# Patient Record
Sex: FEMALE | Race: AMERICAN INDIAN OR ALASKA NATIVE | NOT HISPANIC OR LATINO
[De-identification: names, ages, dates, MRNs, and addresses within clinical notes are randomized per-mention and may not be internally consistent; named-entity substitution may affect disease eponyms.]

---

## 2017-10-05 PROBLEM — Z00.00 ENCOUNTER FOR PREVENTIVE HEALTH EXAMINATION: Status: ACTIVE | Noted: 2017-10-05

## 2018-01-23 DIAGNOSIS — Z82.49 FAMILY HISTORY OF ISCHEMIC HEART DISEASE AND OTHER DISEASES OF THE CIRCULATORY SYSTEM: ICD-10-CM

## 2018-01-23 DIAGNOSIS — Z80.3 FAMILY HISTORY OF MALIGNANT NEOPLASM OF BREAST: ICD-10-CM

## 2018-01-23 DIAGNOSIS — Z83.3 FAMILY HISTORY OF DIABETES MELLITUS: ICD-10-CM

## 2018-01-23 DIAGNOSIS — E55.9 VITAMIN D DEFICIENCY, UNSPECIFIED: ICD-10-CM

## 2018-01-23 DIAGNOSIS — E53.8 DEFICIENCY OF OTHER SPECIFIED B GROUP VITAMINS: ICD-10-CM

## 2018-01-23 RX ORDER — GUARN/MA-HUANG/P.GIN/S.GINSENG
600-200 TABLET ORAL
Refills: 0 | Status: ACTIVE | COMMUNITY
Start: 2018-01-23

## 2018-01-23 RX ORDER — CHROMIUM 200 MCG
1000 TABLET ORAL DAILY
Refills: 0 | Status: ACTIVE | COMMUNITY
Start: 2018-01-23

## 2018-01-23 RX ORDER — CHLORHEXIDINE GLUCONATE 4 %
1000 LIQUID (ML) TOPICAL
Qty: 90 | Refills: 3 | Status: ACTIVE | COMMUNITY
Start: 2018-01-23

## 2018-01-24 ENCOUNTER — APPOINTMENT (OUTPATIENT)
Dept: ENDOCRINOLOGY | Facility: CLINIC | Age: 68
End: 2018-01-24
Payer: MEDICARE

## 2018-01-24 VITALS
BODY MASS INDEX: 22.49 KG/M2 | WEIGHT: 135 LBS | OXYGEN SATURATION: 96 % | SYSTOLIC BLOOD PRESSURE: 122 MMHG | DIASTOLIC BLOOD PRESSURE: 61 MMHG | TEMPERATURE: 98.1 F | HEIGHT: 65 IN | HEART RATE: 64 BPM

## 2018-01-24 DIAGNOSIS — K76.89 OTHER SPECIFIED DISEASES OF LIVER: ICD-10-CM

## 2018-01-24 PROCEDURE — 99214 OFFICE O/P EST MOD 30 MIN: CPT

## 2018-01-24 RX ORDER — LEVOTHYROXINE SODIUM 0.1 MG/1
100 TABLET ORAL
Qty: 90 | Refills: 3 | Status: DISCONTINUED | COMMUNITY
Start: 2018-01-23 | End: 2018-01-24

## 2018-02-24 ENCOUNTER — INPATIENT (INPATIENT)
Facility: HOSPITAL | Age: 68
LOS: 1 days | Discharge: HOME | End: 2018-02-26
Attending: HOSPITALIST

## 2018-02-24 VITALS
HEART RATE: 58 BPM | TEMPERATURE: 97 F | DIASTOLIC BLOOD PRESSURE: 65 MMHG | SYSTOLIC BLOOD PRESSURE: 143 MMHG | OXYGEN SATURATION: 100 % | RESPIRATION RATE: 18 BRPM

## 2018-02-24 DIAGNOSIS — E89.0 POSTPROCEDURAL HYPOTHYROIDISM: Chronic | ICD-10-CM

## 2018-02-24 LAB
ALBUMIN SERPL ELPH-MCNC: 4.4 G/DL — SIGNIFICANT CHANGE UP (ref 3–5.5)
ALP SERPL-CCNC: 50 U/L — SIGNIFICANT CHANGE UP (ref 30–115)
ALT FLD-CCNC: 26 U/L — SIGNIFICANT CHANGE UP (ref 0–41)
ANION GAP SERPL CALC-SCNC: 10 MMOL/L — SIGNIFICANT CHANGE UP (ref 7–14)
AST SERPL-CCNC: 38 U/L — SIGNIFICANT CHANGE UP (ref 0–41)
BILIRUB SERPL-MCNC: 0.8 MG/DL — SIGNIFICANT CHANGE UP (ref 0.2–1.2)
BUN SERPL-MCNC: 6 MG/DL — LOW (ref 10–20)
CALCIUM SERPL-MCNC: 9 MG/DL — SIGNIFICANT CHANGE UP (ref 8.5–10.1)
CHLORIDE SERPL-SCNC: 88 MMOL/L — LOW (ref 98–110)
CK MB BLD-MCNC: 2 % — SIGNIFICANT CHANGE UP (ref 0–4)
CK MB BLD-MCNC: 2 % — SIGNIFICANT CHANGE UP (ref 0–4)
CK MB CFR SERPL CALC: 3.4 NG/ML — SIGNIFICANT CHANGE UP (ref 0.6–6.3)
CK MB CFR SERPL CALC: 3.9 NG/ML — SIGNIFICANT CHANGE UP (ref 0.6–6.3)
CK SERPL-CCNC: 220 U/L — SIGNIFICANT CHANGE UP (ref 0–225)
CK SERPL-CCNC: 257 U/L — HIGH (ref 0–225)
CO2 SERPL-SCNC: 25 MMOL/L — SIGNIFICANT CHANGE UP (ref 17–32)
CREAT SERPL-MCNC: 0.7 MG/DL — SIGNIFICANT CHANGE UP (ref 0.7–1.5)
GLUCOSE SERPL-MCNC: 108 MG/DL — SIGNIFICANT CHANGE UP (ref 70–110)
HCT VFR BLD CALC: 31.7 % — LOW (ref 37–47)
HGB BLD-MCNC: 10.9 G/DL — LOW (ref 14–18)
MCHC RBC-ENTMCNC: 29.5 PG — SIGNIFICANT CHANGE UP (ref 27–31)
MCHC RBC-ENTMCNC: 34.4 G/DL — SIGNIFICANT CHANGE UP (ref 32–37)
MCV RBC AUTO: 85.9 FL — SIGNIFICANT CHANGE UP (ref 81–91)
NRBC # BLD: 0 /100 WBCS — SIGNIFICANT CHANGE UP (ref 0–0)
PLATELET # BLD AUTO: 191 K/UL — SIGNIFICANT CHANGE UP (ref 130–400)
POTASSIUM SERPL-MCNC: 3.7 MMOL/L — SIGNIFICANT CHANGE UP (ref 3.5–5)
POTASSIUM SERPL-SCNC: 3.7 MMOL/L — SIGNIFICANT CHANGE UP (ref 3.5–5)
PROT SERPL-MCNC: 6.7 G/DL — SIGNIFICANT CHANGE UP (ref 6–8)
RBC # BLD: 3.69 M/UL — LOW (ref 4.2–5.4)
RBC # FLD: 13.2 % — SIGNIFICANT CHANGE UP (ref 11.5–14.5)
SODIUM SERPL-SCNC: 123 MMOL/L — LOW (ref 135–146)
TROPONIN I SERPL-MCNC: <0.02 NG/ML — SIGNIFICANT CHANGE UP (ref 0–0.05)
TROPONIN I SERPL-MCNC: <0.02 NG/ML — SIGNIFICANT CHANGE UP (ref 0–0.05)
WBC # BLD: 5.23 K/UL — SIGNIFICANT CHANGE UP (ref 4.8–10.8)
WBC # FLD AUTO: 5.23 K/UL — SIGNIFICANT CHANGE UP (ref 4.8–10.8)

## 2018-02-24 RX ORDER — PREGABALIN 225 MG/1
1000 CAPSULE ORAL DAILY
Qty: 0 | Refills: 0 | Status: DISCONTINUED | OUTPATIENT
Start: 2018-02-24 | End: 2018-02-26

## 2018-02-24 RX ORDER — PANTOPRAZOLE SODIUM 20 MG/1
40 TABLET, DELAYED RELEASE ORAL
Qty: 0 | Refills: 0 | Status: DISCONTINUED | OUTPATIENT
Start: 2018-02-24 | End: 2018-02-26

## 2018-02-24 RX ORDER — SODIUM CHLORIDE 9 MG/ML
1000 INJECTION INTRAMUSCULAR; INTRAVENOUS; SUBCUTANEOUS ONCE
Qty: 0 | Refills: 0 | Status: COMPLETED | OUTPATIENT
Start: 2018-02-24 | End: 2018-02-24

## 2018-02-24 RX ORDER — GLUCAGON INJECTION, SOLUTION 0.5 MG/.1ML
1 INJECTION, SOLUTION SUBCUTANEOUS ONCE
Qty: 0 | Refills: 0 | Status: DISCONTINUED | OUTPATIENT
Start: 2018-02-24 | End: 2018-02-26

## 2018-02-24 RX ORDER — HYDROCHLOROTHIAZIDE 25 MG
0 TABLET ORAL
Qty: 0 | Refills: 0 | COMMUNITY

## 2018-02-24 RX ORDER — ASPIRIN/CALCIUM CARB/MAGNESIUM 324 MG
81 TABLET ORAL DAILY
Qty: 0 | Refills: 0 | Status: DISCONTINUED | OUTPATIENT
Start: 2018-02-24 | End: 2018-02-26

## 2018-02-24 RX ORDER — LEVOTHYROXINE SODIUM 125 MCG
100 TABLET ORAL EVERY 24 HOURS
Qty: 0 | Refills: 0 | Status: DISCONTINUED | OUTPATIENT
Start: 2018-02-24 | End: 2018-02-25

## 2018-02-24 RX ORDER — INSULIN GLARGINE 100 [IU]/ML
12 INJECTION, SOLUTION SUBCUTANEOUS EVERY MORNING
Qty: 0 | Refills: 0 | Status: DISCONTINUED | OUTPATIENT
Start: 2018-02-24 | End: 2018-02-26

## 2018-02-24 RX ORDER — ENOXAPARIN SODIUM 100 MG/ML
40 INJECTION SUBCUTANEOUS AT BEDTIME
Qty: 0 | Refills: 0 | Status: DISCONTINUED | OUTPATIENT
Start: 2018-02-24 | End: 2018-02-26

## 2018-02-24 RX ORDER — ATORVASTATIN CALCIUM 80 MG/1
20 TABLET, FILM COATED ORAL AT BEDTIME
Qty: 0 | Refills: 0 | Status: DISCONTINUED | OUTPATIENT
Start: 2018-02-24 | End: 2018-02-26

## 2018-02-24 RX ORDER — LEVOTHYROXINE SODIUM 125 MCG
0 TABLET ORAL
Qty: 0 | Refills: 0 | COMMUNITY

## 2018-02-24 RX ORDER — AMLODIPINE BESYLATE 2.5 MG/1
5 TABLET ORAL DAILY
Qty: 0 | Refills: 0 | Status: DISCONTINUED | OUTPATIENT
Start: 2018-02-24 | End: 2018-02-26

## 2018-02-24 RX ORDER — DEXTROSE 50 % IN WATER 50 %
1 SYRINGE (ML) INTRAVENOUS ONCE
Qty: 0 | Refills: 0 | Status: DISCONTINUED | OUTPATIENT
Start: 2018-02-24 | End: 2018-02-26

## 2018-02-24 RX ORDER — CHOLECALCIFEROL (VITAMIN D3) 125 MCG
1000 CAPSULE ORAL DAILY
Qty: 0 | Refills: 0 | Status: DISCONTINUED | OUTPATIENT
Start: 2018-02-24 | End: 2018-02-26

## 2018-02-24 RX ORDER — INSULIN GLARGINE 100 [IU]/ML
12 INJECTION, SOLUTION SUBCUTANEOUS AT BEDTIME
Qty: 0 | Refills: 0 | Status: DISCONTINUED | OUTPATIENT
Start: 2018-02-24 | End: 2018-02-24

## 2018-02-24 RX ORDER — SODIUM CHLORIDE 9 MG/ML
1000 INJECTION, SOLUTION INTRAVENOUS
Qty: 0 | Refills: 0 | Status: DISCONTINUED | OUTPATIENT
Start: 2018-02-24 | End: 2018-02-26

## 2018-02-24 RX ORDER — DEXTROSE 50 % IN WATER 50 %
12.5 SYRINGE (ML) INTRAVENOUS ONCE
Qty: 0 | Refills: 0 | Status: DISCONTINUED | OUTPATIENT
Start: 2018-02-24 | End: 2018-02-26

## 2018-02-24 RX ORDER — INSULIN LISPRO 100/ML
4 VIAL (ML) SUBCUTANEOUS
Qty: 0 | Refills: 0 | Status: DISCONTINUED | OUTPATIENT
Start: 2018-02-24 | End: 2018-02-26

## 2018-02-24 RX ORDER — METFORMIN HYDROCHLORIDE 850 MG/1
0 TABLET ORAL
Qty: 0 | Refills: 0 | COMMUNITY

## 2018-02-24 RX ADMIN — AMLODIPINE BESYLATE 5 MILLIGRAM(S): 2.5 TABLET ORAL at 20:44

## 2018-02-24 RX ADMIN — Medication 1000 UNIT(S): at 22:16

## 2018-02-24 RX ADMIN — SODIUM CHLORIDE 1000 MILLILITER(S): 9 INJECTION INTRAMUSCULAR; INTRAVENOUS; SUBCUTANEOUS at 15:00

## 2018-02-24 RX ADMIN — Medication 81 MILLIGRAM(S): at 22:15

## 2018-02-24 RX ADMIN — Medication 100 MICROGRAM(S): at 21:37

## 2018-02-24 RX ADMIN — PREGABALIN 1000 MICROGRAM(S): 225 CAPSULE ORAL at 22:15

## 2018-02-24 RX ADMIN — ATORVASTATIN CALCIUM 20 MILLIGRAM(S): 80 TABLET, FILM COATED ORAL at 21:37

## 2018-02-24 RX ADMIN — ENOXAPARIN SODIUM 40 MILLIGRAM(S): 100 INJECTION SUBCUTANEOUS at 21:37

## 2018-02-24 RX ADMIN — PANTOPRAZOLE SODIUM 40 MILLIGRAM(S): 20 TABLET, DELAYED RELEASE ORAL at 21:37

## 2018-02-24 RX ADMIN — SODIUM CHLORIDE 1000 MILLILITER(S): 9 INJECTION INTRAMUSCULAR; INTRAVENOUS; SUBCUTANEOUS at 16:07

## 2018-02-24 NOTE — ED PROVIDER NOTE - NS ED ROS FT
Constitutional: No fever, chills.  Eyes: No visual changes.  ENT: No hearing changes. No sore throat.  Neck: No neck pain or stiffness.  Cardiovascular: No chest pain, palpitations, edema.  Pulmonary: No SOB, cough. No hemoptysis.  Abdominal: No abdominal pain, nausea, vomiting, diarrhea.  : No dysuria, frequency.  Neuro: + headache. No syncope. + dizziness, resolved.  MS: No back pain. No calf pain/swelling.  Psych: No suicidal ideations.

## 2018-02-24 NOTE — ED PROVIDER NOTE - OBJECTIVE STATEMENT
Pt is a 68 y/o female with hx of DM, HTN, DLD, who presents to ED for headache that started this morning, occipital. Pt states has had similar headache intermittent over several years but today headache associated with dizziness and facial and left arm tingling so came in for eval. Pt states headache improved with Alleve and she denies any tingling or dizziness at this time. no visual change, n/v, fever, chest pain, SOB.

## 2018-02-24 NOTE — H&P ADULT - NSHPLABSRESULTS_GEN_ALL_CORE
10.9   5.23  )-----------( 191      ( 24 Feb 2018 14:30 )             31.7     02-24    123<L>  |  88<L>  |  6<L>  ----------------------------<  108  3.7   |  25  |  0.7    Ca    9.0      24 Feb 2018 14:30    TPro  6.7  /  Alb  4.4  /  TBili  0.8  /  DBili  x   /  AST  38  /  ALT  26  /  AlkPhos  50  02-24        CARDIAC MARKERS ( 24 Feb 2018 14:30 )  <0.02 ng/mL / x     / 257 U/L / x     / 3.9 ng/mL    CAPILLARY BLOOD GLUCOSE  108 (24 Feb 2018 14:30)    ECG: Sinus bradychardia to 50    IMPRESSION:     1.  No evidence of acute intracranial pathology.      2.  Mild chronic microvascular changes.    < end of copied text >    < from: Xray Chest 1 View AP/PA (02.24.18 @ 15:10) >    Impression:      1.  Right basilar subsegmental atelectasis.  No focal lung consolidation.    < end of copied text >

## 2018-02-24 NOTE — H&P ADULT - NSHPPHYSICALEXAM_GEN_ALL_CORE
PHYSICAL EXAM:    T(F): 97.3, Max: 97.3 (02-24-18 @ 12:22)  HR: 58  BP: 143/65 ; Repeat BP: 180/80  RR: 18  SpO2: 100%    GENERAL: NAD, well-developed  HEAD:  Atraumatic, Normocephalic  EYES: EOMI, PERRLA, conjunctiva and sclera clear  NECK: Supple, No JVD, thyroidectomy scar  CHEST/LUNG: Clear to auscultation bilaterally; No wheeze  HEART: Regular rhythm; bradychardic; No murmurs, rubs, or gallops  ABDOMEN: Soft, Nontender, Nondistended; Bowel sounds present  EXTREMITIES:  2+ Peripheral Pulses, No clubbing, cyanosis, or edema  PSYCH: AAOx3  NEUROLOGY: non-focal; normal motor power in all extremities, normal sensation, normal cranial nerves, normal reflexes, negative babinski  SKIN: No rashes or lesions

## 2018-02-24 NOTE — H&P ADULT - NSHPSOCIALHISTORY_GEN_ALL_CORE
Social History:    Lives with: ( x ) alone  (  ) children   (  ) spouse   (  ) parents  (  ) other    Substance Use (street drugs): ( x ) never used  (  ) other:  Tobacco Usage:  ( x  ) never smoked   (   ) former smoker   (   ) current smoker  (     ) pack year  (        ) last cigarette date  Alcohol Usage: negative

## 2018-02-24 NOTE — ED PROVIDER NOTE - ATTENDING CONTRIBUTION TO CARE
66yo woman h/o DM, HTN, DLD, chronic HA c/o similar occipital HA this am but associated with dizziness, facial and L arm tingling, which is not typical for her HA. Sx improved with NSAIDS and in the ED pt asymptomatic other than mild HA, just very concerned. Denies visual changes, chest pain, focal weakness. VS, exam as noted, neuro intact with no dysmetria and normal gait. Head CT ok but pt hyponatremic to 123. Given electrolyte abnl and risk factors for CVA, pt admitted for further workup.

## 2018-02-24 NOTE — H&P ADULT - ASSESSMENT
68 yo female patient with PMHx of systemic HTN, DL, DM II, Acquired hypothyroidism, presented with headache and facial and arm numbness found to have hyponatremia and elevated BP.    Working diagnosis: Symptomatic Hyponatremia and Headache  Will admit to Medicine for management    # Symptomatic hypoNa (with arms numbness, headache?)  Most likely secondary to HCTZ ;  Also patient noted that she drinks around 3L of water per day  Clinically patient looks euvolemic  Will F/U Na levels  Will discontinue HCTZ for now  Check serum and urine osmolality, TSH, lipid panel.  If needed, may consult renal    # Headache  Could be secondary to uncontrolled HTN, or to hyponatremia  will increase enalapril to 20mg BID, and add amlodipine 5 (since we will hold hctz for now)  Symptomatic management of headache  May check 2D echo to evaluate for LVH    # Dyslipidemia  check lipid panel; continue with atorvastatin    # Diabetes mellitus  switch to s/c insulin, check Hba1c    # Normocytic anemia Hg 10.9 (was 12.5 2 years ago)  Patient has no evidence of overt bleeding, no melena, no vaginal bleeding  will do anemia work-up    # DVT prophylaxis  # No GI prophylaxis needed  # No IV fluids for now (received 2L NS in ED)  # Diet: carb consistent, 2gr Na  # Activity: as tolerated  # Full code  # Dispo Home

## 2018-02-24 NOTE — ED PROVIDER NOTE - PHYSICAL EXAMINATION
Constitutional: Well developed, well nourished. NAD  Head: Normocephalic, atraumatic.  Eyes: PERRL. EOMI.  ENT: No nasal discharge. Mucous membranes moist.  Neck: Supple. Painless ROM.  Cardiovascular: Normal S1, S2. Regular rate and rhythm. No murmurs, rubs, or gallops.  Pulmonary: Normal respiratory rate and effort. Lungs clear to auscultation bilaterally. No wheezing, rales, or rhonchi.  Abdominal: Soft. Nondistended. Nontender. No rebound, guarding, rigidity.  Extremities. Pelvis stable. No lower extremity edema, symmetric calves.  Skin: No rashes, cyanosis.  Neuro: CN II-XII grossly intact, no facial asymmetry, so slurred speech. Strength 5/5 in upper and lower extremities. Sensation intact in all extremities. FNF testing normal. No pronator drift. Negative Romberg's test. Normal gait.   Psych: Normal mood. Normal affect.

## 2018-02-24 NOTE — H&P ADULT - HISTORY OF PRESENT ILLNESS
66 yo female patient with PMHx of systemic HTN, DL, DM II, Acquired Hypothyroidism (s/p Rt partial thyroidectomy, followed by radioactive iodine ablation of the left side because of left lobe thyroid malignant nodule in 2000), presented because of the above chief complaint.  History goes back to the morning of presentation, when patient started having occipital headache, moderate in intensity, tension like, associated with nausea and facial numbness and bilateral arms numbness. Symptoms were continuous for like 2h then became waxing and waning. Patient admitted having similar episodes of headache, at the same spot, its been years, like twice a month, resolving with tylenol or ibuprofen, but she never had these numbness before, that's why she came to ED today.  In ED, patient was almost asymptomatic, told me she still has minimal headache, and the numbness is intermittent.  No visual symptoms, no dizziness, no fever, no vomiting, no cough, no chest pain, no palpitations, no abdominal pain, no urinary symptoms, no diarrhea or constipation, no melena, no vaginal bleeding...

## 2018-02-24 NOTE — H&P ADULT - ATTENDING COMMENTS
Patient seen and examined independently. I agree with the resident's note, physical exam, and plan except as below.  #Headache and B/l upperextremity parasthesias - improved - seen by neuro - request Ct cspine  #hyponatremia - off hctz - improved - no need for further workup, check TSH level  #DM II - pt wants to stop metformin - will try diet control  if Ct cspine wnl can dc home

## 2018-02-25 LAB
ANION GAP SERPL CALC-SCNC: 6 MMOL/L — LOW (ref 7–14)
BUN SERPL-MCNC: 8 MG/DL — LOW (ref 10–20)
CALCIUM SERPL-MCNC: 8.7 MG/DL — SIGNIFICANT CHANGE UP (ref 8.5–10.1)
CHLORIDE SERPL-SCNC: 99 MMOL/L — SIGNIFICANT CHANGE UP (ref 98–110)
CHOLEST SERPL-MCNC: 189 MG/DL — SIGNIFICANT CHANGE UP (ref 100–200)
CO2 SERPL-SCNC: 28 MMOL/L — SIGNIFICANT CHANGE UP (ref 17–32)
CREAT SERPL-MCNC: 0.8 MG/DL — SIGNIFICANT CHANGE UP (ref 0.7–1.5)
GLUCOSE SERPL-MCNC: 105 MG/DL — SIGNIFICANT CHANGE UP (ref 70–110)
HCT VFR BLD CALC: 32.3 % — LOW (ref 37–47)
HDLC SERPL-MCNC: 109 MG/DL — HIGH (ref 40–60)
HGB BLD-MCNC: 11 G/DL — LOW (ref 14–18)
LIPID PNL WITH DIRECT LDL SERPL: 62 MG/DL — SIGNIFICANT CHANGE UP (ref 50–100)
MCHC RBC-ENTMCNC: 29.6 PG — SIGNIFICANT CHANGE UP (ref 27–31)
MCHC RBC-ENTMCNC: 34.1 G/DL — SIGNIFICANT CHANGE UP (ref 32–37)
MCV RBC AUTO: 87.1 FL — SIGNIFICANT CHANGE UP (ref 81–91)
NRBC # BLD: 0 /100 WBCS — SIGNIFICANT CHANGE UP (ref 0–0)
OSMOLALITY SERPL: 279 MOS/KG — SIGNIFICANT CHANGE UP (ref 289–308)
PLATELET # BLD AUTO: 193 K/UL — SIGNIFICANT CHANGE UP (ref 130–400)
POTASSIUM SERPL-MCNC: 4.5 MMOL/L — SIGNIFICANT CHANGE UP (ref 3.5–5)
POTASSIUM SERPL-SCNC: 4.5 MMOL/L — SIGNIFICANT CHANGE UP (ref 3.5–5)
RBC # BLD: 3.71 M/UL — LOW (ref 4.2–5.4)
RBC # BLD: 3.71 M/UL — LOW (ref 4.2–5.4)
RBC # FLD: 13.7 % — SIGNIFICANT CHANGE UP (ref 11.5–14.5)
RETICS #: 52.7 K/UL — SIGNIFICANT CHANGE UP (ref 25–125)
RETICS/RBC NFR: 1.4 % — SIGNIFICANT CHANGE UP (ref 0.5–1.5)
SODIUM SERPL-SCNC: 133 MMOL/L — LOW (ref 135–146)
TOTAL CHOLESTEROL/HDL RATIO MEASUREMENT: 1.7 RATIO — LOW (ref 4–5.5)
TRIGL SERPL-MCNC: 54 MG/DL — SIGNIFICANT CHANGE UP (ref 40–150)
WBC # BLD: 4.88 K/UL — SIGNIFICANT CHANGE UP (ref 4.8–10.8)
WBC # FLD AUTO: 4.88 K/UL — SIGNIFICANT CHANGE UP (ref 4.8–10.8)

## 2018-02-25 RX ORDER — LEVOTHYROXINE SODIUM 125 MCG
100 TABLET ORAL DAILY
Qty: 0 | Refills: 0 | Status: DISCONTINUED | OUTPATIENT
Start: 2018-02-25 | End: 2018-02-25

## 2018-02-25 RX ORDER — LEVOTHYROXINE SODIUM 125 MCG
88 TABLET ORAL DAILY
Qty: 0 | Refills: 0 | Status: DISCONTINUED | OUTPATIENT
Start: 2018-02-25 | End: 2018-02-26

## 2018-02-25 RX ADMIN — ENOXAPARIN SODIUM 40 MILLIGRAM(S): 100 INJECTION SUBCUTANEOUS at 21:33

## 2018-02-25 RX ADMIN — ATORVASTATIN CALCIUM 20 MILLIGRAM(S): 80 TABLET, FILM COATED ORAL at 21:32

## 2018-02-25 RX ADMIN — Medication 81 MILLIGRAM(S): at 11:47

## 2018-02-25 RX ADMIN — PREGABALIN 1000 MICROGRAM(S): 225 CAPSULE ORAL at 11:48

## 2018-02-25 RX ADMIN — Medication 1000 UNIT(S): at 11:47

## 2018-02-25 RX ADMIN — PANTOPRAZOLE SODIUM 40 MILLIGRAM(S): 20 TABLET, DELAYED RELEASE ORAL at 08:50

## 2018-02-25 RX ADMIN — Medication 88 MICROGRAM(S): at 09:01

## 2018-02-25 NOTE — CONSULT NOTE ADULT - ATTENDING COMMENTS
Patient seen and examined agree with above except as noted.  Patient with 1 week of occipital HA and episode of b/l UE paresthesias.  She still has 5/10 HA and paresthesias have resolved.    Exam  A+Ox3  CN 2-12 normal  power 5/5 throughout  PP, Temp, Vib symmetric  FTN normal  DTR 2+ in LUE and 1+ RUE; 1+ b/l LE  Gait normal    A/P Patient p/w HA and UE paresthesias.  Likely cervical radiculopathy  1. CT cervical spine  2. NSAIDs PRN  3. f/u as out patient if cspine not severe

## 2018-02-25 NOTE — CONSULT NOTE ADULT - SUBJECTIVE AND OBJECTIVE BOX
CC: headache and bilateral arm numbness    HPI:   68 yo female patient with PMH of HTN, DLD, DM II, Thyroid CA, Hypothyroidism (s/p Rt partial thyroidectomy, followed by radioactive iodine ablation of the left side because of left lobe thyroid malignant nodule in 2000), presented because of the above chief complaint.  History goes back to the morning of presentation, when patient started to experience gradual onset  occipital headache which was constant in nature  moderate in intensity, tension like, associated with nausea vomiting x 3  facial  and bilateral arms numbness / paresthesias . Headache was continuous but numbness and paresthesias or arms and face  was  waxing and waning. Patient reports occasional headaches may 2-3 times a month  but she never had  numbness before. No visual symptoms, neck or joint pain ,denies radicular pain dizziness,  fever/chills, cough, no chest pain, palpitations photo or phonophobia . Patient symptoms were noted to be significantly improved on initial eval in ED. Currently denies symptoms.     Home medications  -synthroid 100 mcg  -enalapril 20 mg q 24  -hctz 12.5 mg q 24  -pravastatin 40 mg q 24  -vitamin b12  -vitamin d3  -metformin 500mg bid    Social history:  denies smoking ,alcohol or drug abuse      Neuro Exam:  Orientation: oriented to person, place time and situation, speech fluent ,language intact  Cranial Nerves: visual acuity intact bilaterally, visual fields full to confrontation, pupils equal round and reactive to light, extraocular motion intact, facial sensation intact symmetrically, face symmetrical, hearing was intact bilaterally, tongue and palate midline, head turning and shoulder shrug symmetric and there was no tongue deviation with protrusion.   Motor: muscle tone was normal in all four extremities, muscle strength was normal 5/5  in all four extremities and normal bulk in all four extremities. No  neck  pain or rigidity  Sensory exam: Intact to pinprick ,temperature vibration and proprioception b/l upper and lower extremity and face.  Coordination:. No dysmetria or limb ataxia   Deep tendon reflexes:   Biceps right 2+. Biceps left 2+.    Triceps right 2+. Triceps left 2+.  LOC  Brachioradialis right 2+. Brachioradialis left 2+.    Patella right 2+. Patella left 2+.    Ankle jerk right 2+. Ankle jerk left 2+.   Gait : deferred        Allergies    hydrALAZINE (Other)  penicillin (Rash)    Intolerances      MEDICATIONS  (STANDING):  amLODIPine   Tablet 5 milliGRAM(s) Oral daily  aspirin enteric coated 81 milliGRAM(s) Oral daily  atorvastatin 20 milliGRAM(s) Oral at bedtime  cholecalciferol 1000 Unit(s) Oral daily  cyanocobalamin 1000 MICROGram(s) Oral daily  dextrose 5%. 1000 milliLiter(s) (50 mL/Hr) IV Continuous <Continuous>  dextrose 50% Injectable 12.5 Gram(s) IV Push once  enalapril 20 milliGRAM(s) Oral two times a day  enoxaparin Injectable 40 milliGRAM(s) SubCutaneous at bedtime  insulin glargine Injectable (LANTUS) 12 Unit(s) SubCutaneous every morning  insulin lispro Injectable (HumaLOG) 4 Unit(s) SubCutaneous three times a day before meals  levothyroxine  Oral Tab/Cap - Peds 100 MICROGram(s) Oral every 24 hours  pantoprazole    Tablet 40 milliGRAM(s) Oral before breakfast    MEDICATIONS  (PRN):  dextrose Gel 1 Dose(s) Oral once PRN Blood Glucose LESS THAN 70 milliGRAM(s)/deciliter  glucagon  Injectable 1 milliGRAM(s) IntraMuscular once PRN Glucose LESS THAN 70 milligrams/deciliter      LABS:                        10.9   5.23  )-----------( 191      ( 24 Feb 2018 14:30 )             31.7     02-24    123<L>  |  88<L>  |  6<L>  ----------------------------<  108  3.7   |  25  |  0.7    Ca    9.0      24 Feb 2018 14:30    TPro  6.7  /  Alb  4.4  /  TBili  0.8  /  DBili  x   /  AST  38  /  ALT  26  /  AlkPhos  50  02-24            Neuro Imaging:  < from: CT Head No Cont (02.24.18 @ 15:51) >  No evidence of acute intracranial pathology.      2.  Mild chronic microvascular changes.        < end of copied text >      EEG:     Echo:   Carotid Doppler: N/A  Telemetry:

## 2018-02-26 ENCOUNTER — TRANSCRIPTION ENCOUNTER (OUTPATIENT)
Age: 68
End: 2018-02-26

## 2018-02-26 VITALS — TEMPERATURE: 98 F

## 2018-02-26 DIAGNOSIS — Z00.00 ENCOUNTER FOR GENERAL ADULT MEDICAL EXAMINATION WITHOUT ABNORMAL FINDINGS: ICD-10-CM

## 2018-02-26 DIAGNOSIS — E11.9 TYPE 2 DIABETES MELLITUS WITHOUT COMPLICATIONS: ICD-10-CM

## 2018-02-26 DIAGNOSIS — I10 ESSENTIAL (PRIMARY) HYPERTENSION: ICD-10-CM

## 2018-02-26 DIAGNOSIS — E87.1 HYPO-OSMOLALITY AND HYPONATREMIA: ICD-10-CM

## 2018-02-26 DIAGNOSIS — D64.9 ANEMIA, UNSPECIFIED: ICD-10-CM

## 2018-02-26 DIAGNOSIS — R51 HEADACHE: ICD-10-CM

## 2018-02-26 DIAGNOSIS — E78.5 HYPERLIPIDEMIA, UNSPECIFIED: ICD-10-CM

## 2018-02-26 LAB
ANION GAP SERPL CALC-SCNC: 9 MMOL/L — SIGNIFICANT CHANGE UP (ref 7–14)
BUN SERPL-MCNC: 9 MG/DL — LOW (ref 10–20)
CALCIUM SERPL-MCNC: 8.8 MG/DL — SIGNIFICANT CHANGE UP (ref 8.5–10.1)
CHLORIDE SERPL-SCNC: 95 MMOL/L — LOW (ref 98–110)
CO2 SERPL-SCNC: 27 MMOL/L — SIGNIFICANT CHANGE UP (ref 17–32)
CREAT SERPL-MCNC: 0.9 MG/DL — SIGNIFICANT CHANGE UP (ref 0.7–1.5)
FERRITIN SERPL-MCNC: 164 NG/ML — HIGH (ref 15–150)
GLUCOSE SERPL-MCNC: 102 MG/DL — SIGNIFICANT CHANGE UP (ref 70–110)
POTASSIUM SERPL-MCNC: 4.3 MMOL/L — SIGNIFICANT CHANGE UP (ref 3.5–5)
POTASSIUM SERPL-SCNC: 4.3 MMOL/L — SIGNIFICANT CHANGE UP (ref 3.5–5)
SODIUM SERPL-SCNC: 131 MMOL/L — LOW (ref 135–146)
TSH SERPL-MCNC: 1.97 UIU/ML — SIGNIFICANT CHANGE UP (ref 0.27–4.2)

## 2018-02-26 RX ORDER — HYDROCHLOROTHIAZIDE 25 MG
12.5 TABLET ORAL
Qty: 0 | Refills: 0 | COMMUNITY

## 2018-02-26 RX ORDER — ATORVASTATIN CALCIUM 80 MG/1
1 TABLET, FILM COATED ORAL
Qty: 30 | Refills: 1 | OUTPATIENT
Start: 2018-02-26 | End: 2018-04-26

## 2018-02-26 RX ORDER — AMLODIPINE BESYLATE 2.5 MG/1
1 TABLET ORAL
Qty: 30 | Refills: 1
Start: 2018-02-26 | End: 2018-04-26

## 2018-02-26 RX ADMIN — PANTOPRAZOLE SODIUM 40 MILLIGRAM(S): 20 TABLET, DELAYED RELEASE ORAL at 06:08

## 2018-02-26 RX ADMIN — Medication 88 MICROGRAM(S): at 06:26

## 2018-02-26 RX ADMIN — AMLODIPINE BESYLATE 5 MILLIGRAM(S): 2.5 TABLET ORAL at 06:08

## 2018-02-26 RX ADMIN — PREGABALIN 1000 MICROGRAM(S): 225 CAPSULE ORAL at 11:14

## 2018-02-26 RX ADMIN — Medication 20 MILLIGRAM(S): at 06:08

## 2018-02-26 RX ADMIN — Medication 81 MILLIGRAM(S): at 11:14

## 2018-02-26 NOTE — PROGRESS NOTE ADULT - PROBLEM SELECTOR PLAN 4
switch to s/c insulin, check Hba1c switch to s/c insulin, HbA1c 6.3 Hg 10.9 (was 12.5 2 years ago)  Patient has no evidence of overt bleeding, no melena, no vaginal bleeding  will do anemia work-up

## 2018-02-26 NOTE — DISCHARGE NOTE ADULT - MEDICATION SUMMARY - MEDICATIONS TO TAKE
I will START or STAY ON the medications listed below when I get home from the hospital:    Aspir 81 oral delayed release tablet  -- 1 tab(s) by mouth once a day  -- Indication: For Health care maintenance    enalapril 20 mg oral tablet  -- 1 tab(s) by mouth once a day  -- Indication: For Hypertension    metFORMIN 500 mg oral tablet, extended release  -- 1 tab(s) by mouth 2 times a day  -- Indication: For Diabetes    pravastatin 40 mg oral tablet  -- 1 tab(s) by mouth once a day  -- Indication: For Dyslipidemia    amLODIPine 5 mg oral tablet  -- 1 tab(s) by mouth once a day  -- Indication: For Hypertension    Fish Oil 1200 mg oral capsule  -- 1 tab(s) by mouth once a day  -- Indication: For Health care maintenance    levothyroxine 100 mcg (0.1 mg) oral capsule  -- 1 cap(s) by mouth once a day (Except on sundays, take 1/2 tab)  -- Indication: For Hypothyroid    Vitamin D3 1000 intl units oral tablet  -- 1 tab(s) by mouth once a day  -- Indication: For Health care maintenance    Vitamin B12  -- 1000 microgram(s) by mouth once a day  -- Indication: For Health care maintenance

## 2018-02-26 NOTE — DISCHARGE NOTE ADULT - ADDITIONAL INSTRUCTIONS
Follow up with your Primary Medical Doctor for a repeat Basic Metabolic Panel and Hypertension follow up.    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

## 2018-02-26 NOTE — PROGRESS NOTE ADULT - SUBJECTIVE AND OBJECTIVE BOX
66 yo female patient with PMHx of systemic HTN, DL, DM II, Acquired Hypothyroidism (s/p Rt partial thyroidectomy, followed by radioactive iodine ablation of the left side because of left lobe thyroid malignant nodule in 2000), presented because of the above chief complaint.  History goes back to the morning of presentation, when patient started having occipital headache, moderate in intensity, tension like, associated with nausea and facial numbness and bilateral arms numbness. Symptoms were continuous for like 2h then became waxing and waning. Patient admitted having similar episodes of headache, at the same spot, its been years, like twice a month, resolving with tylenol or ibuprofen, but she never had these numbness before, that's why she came to ED today.  In ED, patient was almost asymptomatic, told me she still has minimal headache, and the numbness is intermittent.  No visual symptoms, no dizziness, no fever, no vomiting, no cough, no chest pain, no palpitations, no abdominal pain, no urinary symptoms, no diarrhea or constipation, no melena, no vaginal bleeding...    PMH & PSH  HTN, DL, DMII, DLD, Hypothy (s/p R partial thyroidectomy and radioactive iodine ablation of left side because of left lobe thyroid malignant nodule in 2000)    Home Medications:  Aspir 81 oral delayed release tablet: 1 tab(s) orally once a day (24 Feb 2018 15:47)  enalapril 20 mg oral tablet: 1 tab(s) orally once a day (24 Feb 2018 15:47)  Fish Oil 1200 mg oral capsule: 1 tab(s) orally once a day (24 Feb 2018 19:06)  hydroCHLOROthiazide: 12.5 milligram(s) orally once a day (24 Feb 2018 19:01)  levothyroxine 100 mcg (0.1 mg) oral capsule: 1 cap(s) orally once a day (Except on sundays, take 1/2 tab) (24 Feb 2018 19:02)  metFORMIN 500 mg oral tablet, extended release: 1 tab(s) orally 2 times a day (24 Feb 2018 19:00)  pravastatin 40 mg oral tablet: 1 tab(s) orally once a day (24 Feb 2018 15:47)  Vitamin B12: 1000 microgram(s) orally once a day (24 Feb 2018 19:06)  Vitamin D3 1000 intl units oral tablet: 1 tab(s) orally once a day (24 Feb 2018 19:05)    MEDICATIONS  (STANDING):  amLODIPine   Tablet 5 milliGRAM(s) Oral daily  aspirin enteric coated 81 milliGRAM(s) Oral daily  atorvastatin 20 milliGRAM(s) Oral at bedtime  cholecalciferol 1000 Unit(s) Oral daily  cyanocobalamin 1000 MICROGram(s) Oral daily  dextrose 5%. 1000 milliLiter(s) (50 mL/Hr) IV Continuous <Continuous>  dextrose 50% Injectable 12.5 Gram(s) IV Push once  enalapril 20 milliGRAM(s) Oral two times a day  enoxaparin Injectable 40 milliGRAM(s) SubCutaneous at bedtime  insulin glargine Injectable (LANTUS) 12 Unit(s) SubCutaneous every morning  insulin lispro Injectable (HumaLOG) 4 Unit(s) SubCutaneous three times a day before meals  levothyroxine 88 MICROGram(s) Oral daily  pantoprazole    Tablet 40 milliGRAM(s) Oral before breakfast    MEDICATIONS  (PRN):  dextrose Gel 1 Dose(s) Oral once PRN Blood Glucose LESS THAN 70 milliGRAM(s)/deciliter  glucagon  Injectable 1 milliGRAM(s) IntraMuscular once PRN Glucose LESS THAN 70 milligrams/deciliter    Vital Signs Last 24 Hrs  T(C): 36.3 (26 Feb 2018 04:50), Max: 36.6 (25 Feb 2018 20:26)  T(F): 97.3 (26 Feb 2018 04:50), Max: 97.9 (25 Feb 2018 20:26)  HR: 45 (26 Feb 2018 06:06) (45 - 51)  BP: 169/81 (26 Feb 2018 06:06) (117/64 - 169/81)  BP(mean): --  RR: 18 (26 Feb 2018 04:50) (18 - 18)  SpO2: 98% (26 Feb 2018 01:07) (98% - 98%)    Physical exam  GENERAL: NAD, well-developed  	HEAD:  Atraumatic, Normocephalic  	EYES: EOMI, PERRLA, conjunctiva and sclera clear  	NECK: Supple, No JVD, thyroidectomy scar  	CHEST/LUNG: Clear to auscultation bilaterally; No wheeze  	HEART: Regular rhythm; bradychardic; No murmurs, rubs, or gallops  	ABDOMEN: Soft, Nontender, Nondistended; Bowel sounds present  	EXTREMITIES:  2+ Peripheral Pulses, No clubbing, cyanosis, or edema  	PSYCH: AAOx3  	NEUROLOGY: non-focal; normal motor power in all extremities, normal sensation, normal cranial nerves, normal reflexes, negative babinski  SKIN: No rashes or lesions    CBC Full  -  ( 25 Feb 2018 11:40 )  WBC Count : 4.88 K/uL  Hemoglobin : 11.0 g/dL  Hematocrit : 32.3 %  Platelet Count - Automated : 193 K/uL  Mean Cell Volume : 87.1 fL  Mean Cell Hemoglobin : 29.6 pg  Mean Cell Hemoglobin Concentration : 34.1 g/dL  Auto Neutrophil # : x  Auto Lymphocyte # : x  Auto Monocyte # : x  Auto Eosinophil # : x  Auto Basophil # : x  Auto Neutrophil % : x  Auto Lymphocyte % : x  Auto Monocyte % : x  Auto Eosinophil % : x  Auto Basophil % : x    02-25    133<L> (from 123)  |  99  |  8<L>  ----------------------------<  105  4.5   |  28  |  0.8    Ca    8.7      25 Feb 2018 11:35    TPro  6.7  /  Alb  4.4  /  TBili  0.8  /  DBili  x   /  AST  38  /  ALT  26  /  AlkPhos  50  02-24      < from: CT Head No Cont (02.24.18 @ 15:51) >  1.  No evidence of acute intracranial pathology.      2.  Mild chronic microvascular changes.    < end of copied text >    < from: Xray Chest 1 View AP/PA (02.24.18 @ 15:10) >  1.  Right basilar subsegmental atelectasis.  No focal lung consolidation.    < end of copied text > 68 yo female patient with PMHx of systemic HTN, DL, DM II, Acquired Hypothyroidism (s/p Rt partial thyroidectomy, followed by radioactive iodine ablation of the left side because of left lobe thyroid malignant nodule in 2000), presented because of the above chief complaint.  History goes back to the morning of presentation, when patient started having occipital headache, moderate in intensity, tension like, associated with nausea and facial numbness and bilateral arms numbness. Symptoms were continuous for like 2h then became waxing and waning. Patient admitted having similar episodes of headache, at the same spot, its been years, like twice a month, resolving with tylenol or ibuprofen, but she never had these numbness before, that's why she came to ED today.  In ED, patient was almost asymptomatic, told me she still has minimal headache, and the numbness is intermittent.  No visual symptoms, no dizziness, no fever, no vomiting, no cough, no chest pain, no palpitations, no abdominal pain, no urinary symptoms, no diarrhea or constipation, no melena, no vaginal bleeding...    PMH & PSH  HTN, DL, DMII, DLD, Hypothy (s/p R partial thyroidectomy and radioactive iodine ablation of left side because of left lobe thyroid malignant nodule in 2000)    Home Medications:  Aspir 81 oral delayed release tablet: 1 tab(s) orally once a day (24 Feb 2018 15:47)  enalapril 20 mg oral tablet: 1 tab(s) orally once a day (24 Feb 2018 15:47)  Fish Oil 1200 mg oral capsule: 1 tab(s) orally once a day (24 Feb 2018 19:06)  hydroCHLOROthiazide: 12.5 milligram(s) orally once a day (24 Feb 2018 19:01)  levothyroxine 100 mcg (0.1 mg) oral capsule: 1 cap(s) orally once a day (Except on sundays, take 1/2 tab) (24 Feb 2018 19:02)  metFORMIN 500 mg oral tablet, extended release: 1 tab(s) orally 2 times a day (24 Feb 2018 19:00)  pravastatin 40 mg oral tablet: 1 tab(s) orally once a day (24 Feb 2018 15:47)  Vitamin B12: 1000 microgram(s) orally once a day (24 Feb 2018 19:06)  Vitamin D3 1000 intl units oral tablet: 1 tab(s) orally once a day (24 Feb 2018 19:05)    MEDICATIONS  (STANDING):  amLODIPine   Tablet 5 milliGRAM(s) Oral daily  aspirin enteric coated 81 milliGRAM(s) Oral daily  atorvastatin 20 milliGRAM(s) Oral at bedtime  cholecalciferol 1000 Unit(s) Oral daily  cyanocobalamin 1000 MICROGram(s) Oral daily  dextrose 5%. 1000 milliLiter(s) (50 mL/Hr) IV Continuous <Continuous>  dextrose 50% Injectable 12.5 Gram(s) IV Push once  enalapril 20 milliGRAM(s) Oral two times a day  enoxaparin Injectable 40 milliGRAM(s) SubCutaneous at bedtime  insulin glargine Injectable (LANTUS) 12 Unit(s) SubCutaneous every morning  insulin lispro Injectable (HumaLOG) 4 Unit(s) SubCutaneous three times a day before meals  levothyroxine 88 MICROGram(s) Oral daily  pantoprazole    Tablet 40 milliGRAM(s) Oral before breakfast    MEDICATIONS  (PRN):  dextrose Gel 1 Dose(s) Oral once PRN Blood Glucose LESS THAN 70 milliGRAM(s)/deciliter  glucagon  Injectable 1 milliGRAM(s) IntraMuscular once PRN Glucose LESS THAN 70 milligrams/deciliter    Vital Signs Last 24 Hrs  T(C): 36.3 (26 Feb 2018 04:50), Max: 36.6 (25 Feb 2018 20:26)  T(F): 97.3 (26 Feb 2018 04:50), Max: 97.9 (25 Feb 2018 20:26)  HR: 45 (26 Feb 2018 06:06) (45 - 51)  BP: 169/81 (26 Feb 2018 06:06) (117/64 - 169/81)  BP(mean): --  RR: 18 (26 Feb 2018 04:50) (18 - 18)  SpO2: 98% (26 Feb 2018 01:07) (98% - 98%)    Physical exam  GENERAL: NAD, well-developed  	HEAD:  Atraumatic, Normocephalic  	EYES: EOMI, PERRLA, conjunctiva and sclera clear  	NECK: Supple, No JVD, thyroidectomy scar  	CHEST/LUNG: Clear to auscultation bilaterally; No wheeze  	HEART: Regular rhythm; bradychardic; No murmurs, rubs, or gallops  	ABDOMEN: Soft, Nontender, Nondistended; Bowel sounds present  	EXTREMITIES:  2+ Peripheral Pulses, No clubbing, cyanosis, or edema  	PSYCH: AAOx3  	NEUROLOGY: non-focal; normal motor power in all extremities, normal sensation, normal cranial nerves, normal reflexes, negative babinski  SKIN: No rashes or lesions    CBC Full  -  ( 25 Feb 2018 11:40 )  WBC Count : 4.88 K/uL  Hemoglobin : 11.0 g/dL  Hematocrit : 32.3 %  Platelet Count - Automated : 193 K/uL  Mean Cell Volume : 87.1 fL  Mean Cell Hemoglobin : 29.6 pg  Mean Cell Hemoglobin Concentration : 34.1 g/dL  Auto Neutrophil # : x  Auto Lymphocyte # : x  Auto Monocyte # : x  Auto Eosinophil # : x  Auto Basophil # : x  Auto Neutrophil % : x  Auto Lymphocyte % : x  Auto Monocyte % : x  Auto Eosinophil % : x  Auto Basophil % : x    02-25  Serum Osm 279  HbA1c 6.3  Total Chol 1.7    133<L> (from 123)  |  99  |  8<L>  ----------------------------<  105  4.5   |  28  |  0.8    Ca    8.7      25 Feb 2018 11:35    TPro  6.7  /  Alb  4.4  /  TBili  0.8  /  DBili  x   /  AST  38  /  ALT  26  /  AlkPhos  50  02-24      < from: CT Head No Cont (02.24.18 @ 15:51) >  1.  No evidence of acute intracranial pathology.      2.  Mild chronic microvascular changes.    < end of copied text >    < from: Xray Chest 1 View AP/PA (02.24.18 @ 15:10) >  1.  Right basilar subsegmental atelectasis.  No focal lung consolidation.    < end of copied text > 66 yo female patient with PMHx of systemic HTN, DL, DM II, Acquired Hypothyroidism (s/p Rt partial thyroidectomy, followed by radioactive iodine ablation of the left side because of left lobe thyroid malignant nodule in 2000), presented because of the above chief complaint.  History goes back to the morning of presentation, when patient started having occipital headache, moderate in intensity, tension like, associated with nausea and facial numbness and bilateral arms numbness. Symptoms were continuous for like 2h then became waxing and waning. Patient admitted having similar episodes of headache, at the same spot, its been years, like twice a month, resolving with tylenol or ibuprofen, but she never had these numbness before, that's why she came to ED today.  In ED, patient was almost asymptomatic, told me she still has minimal headache, and the numbness is intermittent.  No visual symptoms, no dizziness, no fever, no vomiting, no cough, no chest pain, no palpitations, no abdominal pain, no urinary symptoms, no diarrhea or constipation, no melena, no vaginal bleeding...    PMH & PSH  HTN, DL, DMII, DLD, Hypothy (s/p R partial thyroidectomy and radioactive iodine ablation of left side because of left lobe thyroid malignant nodule in 2000)    Home Medications:  Aspir 81 oral delayed release tablet: 1 tab(s) orally once a day (24 Feb 2018 15:47)  enalapril 20 mg oral tablet: 1 tab(s) orally once a day (24 Feb 2018 15:47)  Fish Oil 1200 mg oral capsule: 1 tab(s) orally once a day (24 Feb 2018 19:06)  hydroCHLOROthiazide: 12.5 milligram(s) orally once a day (24 Feb 2018 19:01)  levothyroxine 100 mcg (0.1 mg) oral capsule: 1 cap(s) orally once a day (Except on sundays, take 1/2 tab) (24 Feb 2018 19:02)  metFORMIN 500 mg oral tablet, extended release: 1 tab(s) orally 2 times a day (24 Feb 2018 19:00)  pravastatin 40 mg oral tablet: 1 tab(s) orally once a day (24 Feb 2018 15:47)  Vitamin B12: 1000 microgram(s) orally once a day (24 Feb 2018 19:06)  Vitamin D3 1000 intl units oral tablet: 1 tab(s) orally once a day (24 Feb 2018 19:05)    MEDICATIONS  (STANDING):  amLODIPine   Tablet 5 milliGRAM(s) Oral daily  aspirin enteric coated 81 milliGRAM(s) Oral daily  atorvastatin 20 milliGRAM(s) Oral at bedtime  cholecalciferol 1000 Unit(s) Oral daily  cyanocobalamin 1000 MICROGram(s) Oral daily  dextrose 5%. 1000 milliLiter(s) (50 mL/Hr) IV Continuous <Continuous>  dextrose 50% Injectable 12.5 Gram(s) IV Push once  enalapril 20 milliGRAM(s) Oral two times a day  enoxaparin Injectable 40 milliGRAM(s) SubCutaneous at bedtime  insulin glargine Injectable (LANTUS) 12 Unit(s) SubCutaneous every morning  insulin lispro Injectable (HumaLOG) 4 Unit(s) SubCutaneous three times a day before meals  levothyroxine 88 MICROGram(s) Oral daily  pantoprazole    Tablet 40 milliGRAM(s) Oral before breakfast    MEDICATIONS  (PRN):  dextrose Gel 1 Dose(s) Oral once PRN Blood Glucose LESS THAN 70 milliGRAM(s)/deciliter  glucagon  Injectable 1 milliGRAM(s) IntraMuscular once PRN Glucose LESS THAN 70 milligrams/deciliter    Vital Signs Last 24 Hrs  T(C): 36.3 (26 Feb 2018 04:50), Max: 36.6 (25 Feb 2018 20:26)  T(F): 97.3 (26 Feb 2018 04:50), Max: 97.9 (25 Feb 2018 20:26)  HR: 45 (26 Feb 2018 06:06) (45 - 51)  BP: 169/81 (26 Feb 2018 06:06) (117/64 - 169/81)  BP(mean): --  RR: 18 (26 Feb 2018 04:50) (18 - 18)  SpO2: 98% (26 Feb 2018 01:07) (98% - 98%)    Physical exam  GENERAL: NAD, well-developed  	HEAD:  Atraumatic, Normocephalic  	EYES: EOMI, PERRLA, conjunctiva and sclera clear  	NECK: Supple, No JVD, thyroidectomy scar  	CHEST/LUNG: Clear to auscultation bilaterally; No wheeze  	HEART: Regular rhythm; bradychardic; No murmurs, rubs, or gallops  	ABDOMEN: Soft, Nontender, Nondistended; Bowel sounds present  	EXTREMITIES:  2+ Peripheral Pulses, No clubbing, cyanosis, or edema  	PSYCH: AAOx3  	NEUROLOGY: non-focal; normal motor power in all extremities, normal sensation, normal cranial nerves, normal reflexes, negative babinski  SKIN: No rashes or lesions    CBC Full  -  ( 25 Feb 2018 11:40 )  WBC Count : 4.88 K/uL  Hemoglobin : 11.0 g/dL  Hematocrit : 32.3 %  Platelet Count - Automated : 193 K/uL  Mean Cell Volume : 87.1 fL  Mean Cell Hemoglobin : 29.6 pg  Mean Cell Hemoglobin Concentration : 34.1 g/dL  Auto Neutrophil # : x  Auto Lymphocyte # : x  Auto Monocyte # : x  Auto Eosinophil # : x  Auto Basophil # : x  Auto Neutrophil % : x  Auto Lymphocyte % : x  Auto Monocyte % : x  Auto Eosinophil % : x  Auto Basophil % : x    02-25  Serum Osm 279  HbA1c 6.3  Total Chol 1.7    133 from 123)  |  99  |  8<L>  --------------------------------------------<  105         4.5          |  28  |  0.8    Ca    8.7      25 Feb 2018 11:35    TPro  6.7  /  Alb  4.4  /  TBili  0.8  /  DBili  x   /  AST  38  /  ALT  26  /  AlkPhos  50  02-24      < from: CT Head No Cont (02.24.18 @ 15:51) >  1.  No evidence of acute intracranial pathology.      2.  Mild chronic microvascular changes.    < from: Xray Chest 1 View AP/PA (02.24.18 @ 15:10) >  1.  Right basilar subsegmental atelectasis.  No focal lung consolidation.    < from: CT Cervical Spine No Cont (02.25.18 @ 17:33) >  There is straightening of the cervical curvature. No acute fracture is   seen. There is no prevertebral soft tissue swelling.No significant   subluxation is seen. The vertebral bodies are unremarkable in height.    There is no significant intervertebral disc space narrowing.    At C2-C3 there is no significant disc pathology, spinal canal or   foraminal stenosis.    At C3-C4 there is a disc bulge with mild spinal canal stenosis. No   significant foraminal stenosis is seen.    At C4-C5 there is no significant disc pathology, spinal canal or   foraminal stenosis.    At C5-C6 there is a disc bulge which indents ventral thecal sac without   significant spinal canal stenosis. And mild left facet arthropathy is   seen without significant foraminal stenosis.    At C6-C7 there is a disc bulge with mild spinal canal stenosis. No   significant foraminal stenosis is seen.    At C7-T1 there is no significant disc pathology, spinal canal or   foraminal stenosis.    Incidentally noted is enlargement of the left piriform sinus and   medialization of the left aryepiglottic fold compatible with focal cord   paralysis.    < end of copied text > 66 yo female patient with PMHx of systemic HTN, DL, DM II, Acquired Hypothyroidism (s/p Rt partial thyroidectomy, followed by radioactive iodine ablation of the left side because of left lobe thyroid malignant nodule in 2000), presented because of the above chief complaint.  History goes back to the morning of presentation, when patient started having occipital headache, moderate in intensity, tension like, associated with nausea and facial numbness and bilateral arms numbness. Symptoms were continuous for like 2h then became waxing and waning. Patient admitted having similar episodes of headache, at the same spot, its been years, like twice a month, resolving with tylenol or ibuprofen, but she never had these numbness before, that's why she came to ED today.  In ED, patient was almost asymptomatic, told me she still has minimal headache, and the numbness is intermittent.  No visual symptoms, no dizziness, no fever, no vomiting, no cough, no chest pain, no palpitations, no abdominal pain, no urinary symptoms, no diarrhea or constipation, no melena, no vaginal bleeding...    PMH & PSH  HTN, DL, DMII, DLD, Hypothy (s/p R partial thyroidectomy and radioactive iodine ablation of left side because of left lobe thyroid malignant nodule in 2000)    Home Medications:  Aspir 81 oral delayed release tablet: 1 tab(s) orally once a day (24 Feb 2018 15:47)  enalapril 20 mg oral tablet: 1 tab(s) orally once a day (24 Feb 2018 15:47)  Fish Oil 1200 mg oral capsule: 1 tab(s) orally once a day (24 Feb 2018 19:06)  hydroCHLOROthiazide: 12.5 milligram(s) orally once a day (24 Feb 2018 19:01)  levothyroxine 100 mcg (0.1 mg) oral capsule: 1 cap(s) orally once a day (Except on sundays, take 1/2 tab) (24 Feb 2018 19:02)  metFORMIN 500 mg oral tablet, extended release: 1 tab(s) orally 2 times a day (24 Feb 2018 19:00)  pravastatin 40 mg oral tablet: 1 tab(s) orally once a day (24 Feb 2018 15:47)  Vitamin B12: 1000 microgram(s) orally once a day (24 Feb 2018 19:06)  Vitamin D3 1000 intl units oral tablet: 1 tab(s) orally once a day (24 Feb 2018 19:05)    MEDICATIONS  (STANDING):  amLODIPine   Tablet 5 milliGRAM(s) Oral daily  aspirin enteric coated 81 milliGRAM(s) Oral daily  atorvastatin 20 milliGRAM(s) Oral at bedtime  cholecalciferol 1000 Unit(s) Oral daily  cyanocobalamin 1000 MICROGram(s) Oral daily  dextrose 5%. 1000 milliLiter(s) (50 mL/Hr) IV Continuous <Continuous>  dextrose 50% Injectable 12.5 Gram(s) IV Push once  enalapril 20 milliGRAM(s) Oral two times a day  enoxaparin Injectable 40 milliGRAM(s) SubCutaneous at bedtime  insulin glargine Injectable (LANTUS) 12 Unit(s) SubCutaneous every morning  insulin lispro Injectable (HumaLOG) 4 Unit(s) SubCutaneous three times a day before meals  levothyroxine 88 MICROGram(s) Oral daily  pantoprazole    Tablet 40 milliGRAM(s) Oral before breakfast    MEDICATIONS  (PRN):  dextrose Gel 1 Dose(s) Oral once PRN Blood Glucose LESS THAN 70 milliGRAM(s)/deciliter  glucagon  Injectable 1 milliGRAM(s) IntraMuscular once PRN Glucose LESS THAN 70 milligrams/deciliter    Vital Signs Last 24 Hrs  T(C): 36.3 (26 Feb 2018 04:50), Max: 36.6 (25 Feb 2018 20:26)  T(F): 97.3 (26 Feb 2018 04:50), Max: 97.9 (25 Feb 2018 20:26)  HR: 45 (26 Feb 2018 06:06) (45 - 51)  BP: 169/81 (26 Feb 2018 06:06) (117/64 - 169/81)  BP(mean): --  RR: 18 (26 Feb 2018 04:50) (18 - 18)  SpO2: 98% (26 Feb 2018 01:07) (98% - 98%)    Physical exam  GENERAL: NAD, well-developed  	HEAD:  Atraumatic, Normocephalic  	EYES: EOMI, PERRLA, conjunctiva and sclera clear  	NECK: Supple, No JVD, thyroidectomy scar  	CHEST/LUNG: Clear to auscultation bilaterally; No wheeze  	HEART: Regular rhythm; bradychardic; No murmurs, rubs, or gallops  	ABDOMEN: Soft, Nontender, Nondistended; Bowel sounds present  	EXTREMITIES:  2+ Peripheral Pulses, No clubbing, cyanosis, or edema  	PSYCH: AAOx3  	NEUROLOGY: non-focal; normal motor power in all extremities, normal sensation, normal cranial nerves, normal reflexes, negative babinski  SKIN: No rashes or lesions                          11.0   4.88  )-----------( 193      ( 25 Feb 2018 11:40 )             32.3     02-25  Serum Osm 279  HbA1c 6.3  Total Chol 1.7    133 from 123)  |  99  |  8<L>  --------------------------------------------<  105         4.5          |  28  |  0.8    Ca    8.7      25 Feb 2018 11:35    TPro  6.7  /  Alb  4.4  /  TBili  0.8  /  DBili  x   /  AST  38  /  ALT  26  /  AlkPhos  50  02-24      < from: CT Head No Cont (02.24.18 @ 15:51) >  1.  No evidence of acute intracranial pathology.      2.  Mild chronic microvascular changes.    < from: Xray Chest 1 View AP/PA (02.24.18 @ 15:10) >  1.  Right basilar subsegmental atelectasis.  No focal lung consolidation.    < from: CT Cervical Spine No Cont (02.25.18 @ 17:33) >  There is straightening of the cervical curvature. No acute fracture is   seen. There is no prevertebral soft tissue swelling.No significant   subluxation is seen. The vertebral bodies are unremarkable in height.    There is no significant intervertebral disc space narrowing.    At C2-C3 there is no significant disc pathology, spinal canal or   foraminal stenosis.    At C3-C4 there is a disc bulge with mild spinal canal stenosis. No   significant foraminal stenosis is seen.    At C4-C5 there is no significant disc pathology, spinal canal or   foraminal stenosis.    At C5-C6 there is a disc bulge which indents ventral thecal sac without   significant spinal canal stenosis. And mild left facet arthropathy is   seen without significant foraminal stenosis.    At C6-C7 there is a disc bulge with mild spinal canal stenosis. No   significant foraminal stenosis is seen.    At C7-T1 there is no significant disc pathology, spinal canal or   foraminal stenosis.    Incidentally noted is enlargement of the left piriform sinus and   medialization of the left aryepiglottic fold compatible with focal cord   paralysis.    < end of copied text >

## 2018-02-26 NOTE — PROGRESS NOTE ADULT - PROBLEM SELECTOR PLAN 7
DVT prophylaxis Lovenox  No GI prophylaxis needed  No IV fluids for now (received 2L NS in ED)  Diet: carb consistent, 2gr Na  Activity: as tolerated  Full code  Dispo Home

## 2018-02-26 NOTE — PROGRESS NOTE ADULT - PROBLEM SELECTOR PLAN 6
DVT prophylaxis Lovenox  No GI prophylaxis needed  No IV fluids for now (received 2L NS in ED)  Diet: carb consistent, 2gr Na  Activity: as tolerated  Full code  Dispo Home check lipid panel; continue with atorvastatin

## 2018-02-26 NOTE — DISCHARGE NOTE ADULT - PLAN OF CARE
prevent recurrence Likely secondary to Hydrochlorothiazide which was discontinued from your home medications.  Maintain fluid intake around 2L/day no acute findings Headache associated with bilateral upper extremity numbness. CT scan of cervical spine negative for any acute pathologies or foraminal stenosis. Symptoms currently resolved. Likely 2/2 to hyponatremia. control BP Take your Enalapril 20 and Amlodipine 5 as instructed. Your Hydrochlorothiazide was discontinued due to side effects. resume therapy Take your Pravastatin as instructed. monitor and treat Take your metformin as instructed. follow up with PMD Stable, asymptomatic. Follow up with your PMD for further workup.

## 2018-02-26 NOTE — PROGRESS NOTE ADULT - SUBJECTIVE AND OBJECTIVE BOX
HOSPITALIST ATTENDING NOTE    PRISCILLA PINEDA  67y Female  [id number]    INTERVAL HPI/OVERNIGHT EVENTS:    T(C): 36.3 (02-26-18 @ 04:50), Max: 36.6 (02-25-18 @ 20:26)  HR: 45 (02-26-18 @ 06:06) (45 - 51)  BP: 169/81 (02-26-18 @ 06:06) (117/64 - 169/81)  RR: 18 (02-26-18 @ 04:50) (18 - 18)  SpO2: 98% (02-26-18 @ 01:07) (98% - 98%)  Wt(kg): --      PHYSICAL EXAM:  GENERAL: NAD  HEAD:  Atraumatic, Normocephalic  EYES: EOMI, PERRLA, conjunctiva and sclera clear  ENMT: No tonsillar erythema, exudates, or enlargement    NERVOUS SYSTEM:  Alert & Oriented X3, Good concentration; Non-focal- Motor Strength 5/5 B/L upper and lower extremities;  CHEST/LUNG: Clear to ascultation bilaterally; No rales, rhonchi, wheezing,   HEART: Regular rate and rhythm; No murmurs, rubs, or gallops  ABDOMEN: Soft, Nontender, Nondistended; Bowel sounds present  EXTREMITIES: No clubbing, cyanosis, or edema  LYMPH: No lymphadenopathy noted  SKIN: No rashes or lesions  PSYCH: No suicidal/homicial ideation/hallucinations    Consultant(s) Notes Reviewed:  [x ] YES  [ ] NO  Care Discussed with Consultants/Other Providers/ Housestaff [ x] YES  [ ] NO    LABS:                        11.0   4.88  )-----------( 193      ( 25 Feb 2018 11:40 )             32.3     02-26    131<L>  |  95<L>  |  9<L>  ----------------------------<  102  4.3   |  27  |  0.9    Ca    8.8      26 Feb 2018 08:54    TPro  6.7  /  Alb  4.4  /  TBili  0.8  /  DBili  x   /  AST  38  /  ALT  26  /  AlkPhos  50  02-24          RADIOLOGY & ADDITIONAL TESTS:    Imaging or report Personally Reviewed:  [ ] YES  [ ] NO    Case discussed with resident    Care discussed with pt/family      HEALTH ISSUES - PROBLEM Dx:  Health care maintenance: Health care maintenance  Hypertension, unspecified type: Hypertension, unspecified type  Dyslipidemia: Dyslipidemia  Diabetes: Diabetes  Normocytic anemia: Normocytic anemia  Headache: Headache  Hyponatremia: Hyponatremia

## 2018-02-26 NOTE — PROGRESS NOTE ADULT - PROBLEM SELECTOR PLAN 3
Hg 10.9 (was 12.5 2 years ago)  Patient has no evidence of overt bleeding, no melena, no vaginal bleeding  will do anemia work-up On Enalapril 20 and Amlodipine 5, BP controlled.  Keep on those medications on discharge.

## 2018-02-26 NOTE — DISCHARGE NOTE ADULT - PATIENT PORTAL LINK FT
You can access the [facility] Patient Portal, offered by [facility], by registering with the following website: [email or web address]

## 2018-02-26 NOTE — DISCHARGE NOTE ADULT - CARE PLAN
Principal Discharge DX:	Hyponatremia  Goal:	prevent recurrence  Assessment and plan of treatment:	Likely secondary to Hydrochlorothiazide which was discontinued from your home medications.  Maintain fluid intake around 2L/day  Secondary Diagnosis:	Headache  Goal:	no acute findings  Assessment and plan of treatment:	Headache associated with bilateral upper extremity numbness. CT scan of cervical spine negative for any acute pathologies or foraminal stenosis. Symptoms currently resolved. Likely 2/2 to hyponatremia.  Secondary Diagnosis:	Hypertension  Goal:	control BP  Assessment and plan of treatment:	Take your Enalapril 20 and Amlodipine 5 as instructed. Your Hydrochlorothiazide was discontinued due to side effects.  Secondary Diagnosis:	Dyslipidemia  Goal:	resume therapy  Assessment and plan of treatment:	Take your Pravastatin as instructed.  Secondary Diagnosis:	Diabetes  Goal:	monitor and treat  Assessment and plan of treatment:	Take your metformin as instructed.  Secondary Diagnosis:	Normocytic anemia  Goal:	follow up with PMD  Assessment and plan of treatment:	Stable, asymptomatic. Follow up with your PMD for further workup.

## 2018-02-26 NOTE — DISCHARGE NOTE ADULT - MEDICATION SUMMARY - MEDICATIONS TO STOP TAKING
I will STOP taking the medications listed below when I get home from the hospital:  None I will STOP taking the medications listed below when I get home from the hospital:    hydroCHLOROthiazide  -- 12.5 milligram(s) by mouth once a day

## 2018-02-26 NOTE — DISCHARGE NOTE ADULT - HOSPITAL COURSE
Patient was admitted for Hyponatremia, received 2L NS in the ED and HCTZ was discontinued. Amlodipine was added to BP medications. Patient reported headache and bilateral upper extremity numbness however CT cervical spine showed no acute findings. CT of the cervical spine showed medialization of the left aryepiglottic fold compatible with focal cord paralysis. No hoarseness and no signs of respiratory difficulties. Can follow up with ENT as an outpatient.

## 2018-02-26 NOTE — PROGRESS NOTE ADULT - PROBLEM SELECTOR PLAN 1
Improving  Most likely secondary to HCTZ ;  Also patient noted that she drinks around 3L of water per day  Clinically patient looks euvolemic  Will F/U Na levels  Will discontinue HCTZ for now  Check serum and urine osmolality, TSH, lipid panel.  If needed, may consult renal Improving  Most likely secondary to HCTZ ;  Also patient noted that she drinks around 3L of water per day    Clinically patient looks euvolemic  Will F/U Na levels  Will discontinue HCTZ for now  Check serum and urine osmolality, TSH, lipid panel.  If needed, may consult renal Improving  Most likely secondary to HCTZ ;  Also patient noted that she drinks around 3L of water per day.    Clinically patient looks euvolemic  Will F/U Na levels  Will discontinue HCTZ for now  Check serum and urine osmolality, TSH, lipid panel.  If needed, may consult renal

## 2018-02-26 NOTE — DISCHARGE NOTE ADULT - PROVIDER TOKENS
FREE:[LAST:[Camille],FIRST:[Michael],PHONE:[phone]],FAX:[(   [phone]    -],ADDRESS:[[address] [place] [address]]]

## 2018-02-26 NOTE — PROGRESS NOTE ADULT - PROBLEM SELECTOR PLAN 2
and UE paresthesia  Could be secondary to uncontrolled HTN, or to hyponatremia  will increase enalapril to 20mg BID, and add amlodipine 5 (since we will hold hctz for now)  Symptomatic management of headache  Neuro cs: CT cervical spine and UE paresthesia  Could be secondary to uncontrolled HTN, or to hyponatremia  will increase enalapril to 20mg BID, and add amlodipine 5 (since we will hold hctz for now); Symptomatic management of headache  Neuro cs: CT cervical spine; no acute fractures or stenosis + medialization of the left aryepiglottic fold compatible with focal cord paralysis.

## 2018-02-26 NOTE — PROGRESS NOTE ADULT - ASSESSMENT
68 yo female patient with PMHx of systemic HTN, DL, DM II, Acquired hypothyroidism, presented with headache and facial and arm numbness found to have hyponatremia and elevated BP.    Working diagnosis: Symptomatic Hyponatremia and Headache      # Symptomatic hypoNa (with arms numbness, headache?) -resolved  Most likely secondary to HCTZ ;  Also patient noted that she drinks around 3L of water per day  Will discontinue HCTZ for now    #parasthesias resolved seen by neuro  < from: CT Cervical Spine No Cont (02.25.18 @ 17:33) >  Impression:    Mild degenerative changes as described above.    Findings compatible with left-sided focal cord paralysis. Clinical   correlation recommended.    < end of copied text >    asymptomatic - no signs of vocal cord paralysis - outpt ENT follow up      # Headache  Could be secondary to uncontrolled HTN, or to hyponatremia  will increase enalapril to 20mg BID, and add amlodipine 5 (since we will hold hctz for now)  Symptomatic management of headache    # Dyslipidemia  check lipid panel; continue with atorvastatin    # Diabetes mellitus  Fs controlled         # D/c Home - follow upwith PMD, meds reviewed , time spent 35 mins
66 yo female patient with PMHx of systemic HTN, DL, DM II, Acquired hypothyroidism, presented with headache and facial and arm numbness found to have hyponatremia and elevated BP.

## 2018-02-26 NOTE — DISCHARGE NOTE ADULT - MEDICATION SUMMARY - MEDICATIONS TO CHANGE
I will SWITCH the dose or number of times a day I take the medications listed below when I get home from the hospital:    Aspir 81 oral delayed release tablet  -- 1 tab(s) by mouth once a day    enalapril 20 mg oral tablet  -- 1 tab(s) by mouth once a day    pravastatin 40 mg oral tablet  -- 1 tab(s) by mouth once a day I will SWITCH the dose or number of times a day I take the medications listed below when I get home from the hospital:  None

## 2018-03-02 DIAGNOSIS — R42 DIZZINESS AND GIDDINESS: ICD-10-CM

## 2018-03-02 DIAGNOSIS — E78.5 HYPERLIPIDEMIA, UNSPECIFIED: ICD-10-CM

## 2018-03-02 DIAGNOSIS — R51 HEADACHE: ICD-10-CM

## 2018-03-02 DIAGNOSIS — E11.9 TYPE 2 DIABETES MELLITUS WITHOUT COMPLICATIONS: ICD-10-CM

## 2018-03-02 DIAGNOSIS — E89.0 POSTPROCEDURAL HYPOTHYROIDISM: ICD-10-CM

## 2018-03-02 DIAGNOSIS — T50.2X5A ADVERSE EFFECT OF CARBONIC-ANHYDRASE INHIBITORS, BENZOTHIADIAZIDES AND OTHER DIURETICS, INITIAL ENCOUNTER: ICD-10-CM

## 2018-03-02 DIAGNOSIS — I10 ESSENTIAL (PRIMARY) HYPERTENSION: ICD-10-CM

## 2018-03-02 DIAGNOSIS — E87.1 HYPO-OSMOLALITY AND HYPONATREMIA: ICD-10-CM

## 2018-03-02 DIAGNOSIS — Z85.850 PERSONAL HISTORY OF MALIGNANT NEOPLASM OF THYROID: ICD-10-CM

## 2018-03-02 DIAGNOSIS — D64.9 ANEMIA, UNSPECIFIED: ICD-10-CM

## 2018-05-16 ENCOUNTER — APPOINTMENT (OUTPATIENT)
Dept: ENDOCRINOLOGY | Facility: CLINIC | Age: 68
End: 2018-05-16

## 2018-08-10 ENCOUNTER — MOBILE ON CALL (OUTPATIENT)
Age: 68
End: 2018-08-10

## 2018-08-15 ENCOUNTER — APPOINTMENT (OUTPATIENT)
Dept: ENDOCRINOLOGY | Facility: CLINIC | Age: 68
End: 2018-08-15
Payer: MEDICARE

## 2018-08-15 VITALS
BODY MASS INDEX: 24.07 KG/M2 | SYSTOLIC BLOOD PRESSURE: 118 MMHG | DIASTOLIC BLOOD PRESSURE: 67 MMHG | OXYGEN SATURATION: 98 % | HEART RATE: 57 BPM | HEIGHT: 64 IN | TEMPERATURE: 98.9 F | WEIGHT: 141 LBS

## 2018-08-15 PROBLEM — E03.9 HYPOTHYROIDISM, UNSPECIFIED: Chronic | Status: ACTIVE | Noted: 2018-02-24

## 2018-08-15 PROBLEM — E78.5 HYPERLIPIDEMIA, UNSPECIFIED: Chronic | Status: ACTIVE | Noted: 2018-02-24

## 2018-08-15 PROBLEM — E11.9 TYPE 2 DIABETES MELLITUS WITHOUT COMPLICATIONS: Chronic | Status: ACTIVE | Noted: 2018-02-24

## 2018-08-15 PROBLEM — I10 ESSENTIAL (PRIMARY) HYPERTENSION: Chronic | Status: ACTIVE | Noted: 2018-02-24

## 2018-08-15 PROCEDURE — 99214 OFFICE O/P EST MOD 30 MIN: CPT

## 2019-01-16 ENCOUNTER — APPOINTMENT (OUTPATIENT)
Dept: ENDOCRINOLOGY | Facility: CLINIC | Age: 69
End: 2019-01-16
Payer: MEDICARE

## 2019-01-16 VITALS
TEMPERATURE: 98.6 F | WEIGHT: 151 LBS | HEART RATE: 63 BPM | DIASTOLIC BLOOD PRESSURE: 69 MMHG | SYSTOLIC BLOOD PRESSURE: 108 MMHG | BODY MASS INDEX: 25.78 KG/M2 | OXYGEN SATURATION: 96 % | HEIGHT: 64 IN

## 2019-01-16 PROCEDURE — 99214 OFFICE O/P EST MOD 30 MIN: CPT

## 2019-01-20 NOTE — PHYSICAL EXAM
[Alert] : alert [Well Nourished] : well nourished [Healthy Appearance] : healthy appearance [Normal Sclera/Conjunctiva] : normal sclera/conjunctiva [EOMI] : extra ocular movement intact [No Proptosis] : no proptosis [No Lid Lag] : no lid lag [Normal Outer Ear/Nose] : the ears and nose were normal in appearance [Normal Hearing] : hearing was normal [No Neck Mass] : no neck mass was observed [No LAD] : no lymphadenopathy [Clear to Auscultation] : lungs were clear to auscultation bilaterally [Normal Rate] : heart rate was normal  [Regular Rhythm] : with a regular rhythm [Carotids Normal] : carotid pulses were normal with no bruits [No Edema] : there was no peripheral edema [Not Tender] : non-tender [Soft] : abdomen soft [No HSM] : no hepato-splenomegaly [Normal] : normal and non tender [No CVA Tenderness] : no ~M costovertebral angle tenderness [Normal Gait] : normal gait [No Joint Swelling] : no joint swelling seen [Normal Strength/Tone] : muscle strength and tone were normal [No Involuntary Movements] : no involuntary movements were seen [No Rash] : no rash [No Tremors] : no tremors [Normal Sensation on Monofilament Testing] : normal sensation on monofilament testing of lower extremities [Normal Affect] : the affect was normal [Normal Mood] : the mood was normal [Kyphosis] : no kyphosis present [Foot Ulcers] : no foot ulcers [Abdominal Striae] : no abdominal striae [Hirsutism] : no hirsutism [Acanthosis Nigricans] : no acanthosis nigricans [de-identified] : Mildly overweight [de-identified] : Pupils miotic.  Dense corneal arcus [de-identified] : Thyroidectomy scar.  No palpable thyroid tissue [de-identified] : No murmurs [de-identified] : DP pulses 3+ bilaterally [de-identified] : No cervical or supraclavicular adenopathy [de-identified] : No tenderness elicited with firm pressure over the second metatarsal head of the R foot--but pt says "It only hurts when I walk" [de-identified] : Vibratory sensation moderately decreased over the toes

## 2019-01-20 NOTE — ASSESSMENT
[FreeTextEntry1] : 1) Type 2 DM: A1c level of 6.4% indicates excellent overall glycemic control.  Most of her fingersticks seem to be i target range except after breakfast, where the elevated values are likely due to excess fruit at the meal.\par --Continue monotherapy with metformin.  Will not push the dose any higher at this point given the excellent FBS.\par --Diet discussed in detail.  Pt needs to limit the fruit at breakfast to half a banana.  Can move the apple (or other half of the banana) to lunch or supper\par --Prescriptions for test strips and lancets sent to the local pharmacy\par \par 2) Post-surgical hypothyroidism:  TSH level is in the optimal range of 0.4-2.5 uU/ml.  Despite the pt's history of thyroid CA, will not target the TSH level to the suppressed range given her long disease-free interval and the risk of accelerated post-menopausal bone loss from supra-physiologic thyroxine replacement.\par --Continue levothyroxine 88 mcg 6 days/week, with 1/2 tablet (44 mcg) 1 day/week.\par \par 3) Hyperlipidemia:  LDL-cholesterol is close to the target of 70 mg%.\par --Continue pravastatin 40 mg/day\par \par 4) Papillary thyroid CA: Thyroglobulin level was undetectable on bloodwork in August of 2018.  Needs an ultrasound later this year.\par \par 5) Hypertension.  BP is excellent on today's exam, but the pt needs to resume regular monitoring at home. \par --Continue enalapril and HCTZ\par \par See for follow-up in 4 months.  CMP, lipids, A1c, TFTs, 25-D before the visit\par Will check on timing of next thyroid ultrasound [FreeTextEntry2] : Diet, post-prandial FS targets, BP targets

## 2019-01-20 NOTE — DATA REVIEWED
[FreeTextEntry1] : [facility]  (1/7/19)\par \par FBS 93, A1c 6.4%\par CMP WNL\par LDL 76, , TG 51\par TSH 1.55 uU/ml (0.45-4.5)\par 25-D level in target range at 41.8 ng/ml

## 2019-01-20 NOTE — REASON FOR VISIT
[Follow-Up: _____] : a [unfilled] follow-up visit [FreeTextEntry1] : type 2 DM, hyperlipidemia and hypothyroidism

## 2019-01-20 NOTE — HISTORY OF PRESENT ILLNESS
[FreeTextEntry1] : 68-year-old woman who is followed for type 2 DM, hyperlipidemia, hypertension, thyroid CA and post-surgical hypothyroidism.  She is s/p partial left thyroid lobectomy in 1969, followed by a completion thyroidectomy in 2000.  The pathology after the latter surgery showed follicular variant of papillary CA, without any local extension or vascular invasion.  She received 75 mCi I-131 post-op to ablate a large left lobe remnant.  She has been free of any recurrent or metastatic disease since that time.  Her diabetes was diagnosed in December of 2016, having progressed after a 3-year history of pre-diabetes. \par \michael Again asked me at the beginning of the visit--"Am I diabetic?"  Reminded that she is, and that she keeps asking me the same question because she doesn't believe it.\michael Has not been testing any fingersticks during the past 3 months because she ran out of lancets (and did not call me about this).  When she was testing prior to running out, her pre-breakfast readings were close to 120, but post-breakfast values were 160-170.\par --Breakfast is a sandwich with 2 slices of cheese and some turkey, but also a full banana, sometimes plus an apple.  \par --Often skips lunch, otherwise has a sandwich\par --Protein at supper at beef 2-3X/week, salmon 3X/week.  Starch is usually potato.  Fingersticks after supper were mostly < 140.\par .--Fruit is excessive in the morning, but she has very little later in the day \michael Has not been checking BPs at home.\par Ophth exams are yearly and have been negative for retinopathy\michael Denies any numbness or paresthesias in her toes, but has been having pain in her R foot beneath the second metatarsal.

## 2019-01-20 NOTE — REVIEW OF SYSTEMS
[Fatigue] : fatigue [Gas/Bloating] : gas/bloating [Difficulty Walking] : difficulty walking [Decreased Appetite] : appetite not decreased [Fever] : no fever [Chills] : no chills [Dry Eyes] : no dryness of the eyes [Eyes Itch] : no itching of the eyes [Dysphagia] : no dysphagia [Hearing Loss] : no hearing loss  [Palpitations] : no palpitations [Lower Ext Edema] : no lower extremity edema [Shortness Of Breath] : no shortness of breath [Wheezing] : no wheezing was heard [Nausea] : no nausea [Constipation] : no constipation [Diarrhea] : no diarrhea [Heartburn] : no heartburn [Polyuria] : no polyuria [Dysuria] : no dysuria [Muscle Cramps] : no muscle cramps [Myalgia] : no myalgia  [Hair Loss] : no hair loss [Dry Skin] : no dry skin [Headache] : no headaches [Tremors] : no tremors [Pain/Numbness of Digits] : no pain/numbness of digits [Depression] : no depression [Anxiety] : no anxiety [Insomnia] : no insomnia [Polydipsia] : no polydipsia [Cold Intolerance] : cold tolerant [Heat Intolerance] : heat tolerant [Easy Bleeding] : no ~M tendency for easy bleeding [Easy Bruising] : no tendency for easy bruising [FreeTextEntry2] : Has gained 10 lb since her last visit [FreeTextEntry3] : Complains of mildly blurred vision even with her glasses [FreeTextEntry5] : Occasional non-exertional chest pain [FreeTextEntry6] : Intermittent dry cough.  PERLA with stairs, sometimes on level ground [FreeTextEntry7] : Usually has 3 BM/day [FreeTextEntry8] : Nocturia 3-4X/night [FreeTextEntry9] : L knee discomfort without swelling.  Pain on the plantar surface of her right foot beneath the second metatarsal head [de-identified] : Ambulation impaired by the pain in her R foot [de-identified] : Increased stress secondary to other family members being ill

## 2019-01-23 ENCOUNTER — MEDICATION RENEWAL (OUTPATIENT)
Age: 69
End: 2019-01-23

## 2019-04-09 ENCOUNTER — MEDICATION RENEWAL (OUTPATIENT)
Age: 69
End: 2019-04-09

## 2019-05-16 ENCOUNTER — APPOINTMENT (OUTPATIENT)
Dept: ENDOCRINOLOGY | Facility: CLINIC | Age: 69
End: 2019-05-16
Payer: MEDICARE

## 2019-05-16 VITALS
DIASTOLIC BLOOD PRESSURE: 77 MMHG | BODY MASS INDEX: 25.44 KG/M2 | HEART RATE: 60 BPM | HEIGHT: 64 IN | TEMPERATURE: 99.3 F | OXYGEN SATURATION: 97 % | WEIGHT: 149 LBS | SYSTOLIC BLOOD PRESSURE: 143 MMHG

## 2019-05-16 PROCEDURE — 99214 OFFICE O/P EST MOD 30 MIN: CPT

## 2019-05-19 RX ORDER — ASPIRIN 81 MG/1
81 TABLET ORAL
Refills: 0 | Status: DISCONTINUED | COMMUNITY
Start: 2018-01-23 | End: 2019-05-19

## 2019-05-19 RX ORDER — ENALAPRIL MALEATE 20 MG/1
20 TABLET ORAL DAILY
Refills: 0 | Status: DISCONTINUED | COMMUNITY
Start: 2018-01-23 | End: 2019-05-19

## 2019-05-19 NOTE — DATA REVIEWED
[FreeTextEntry1] : [facility]  (5/7/19)\par \par FBS 87,  A1c 6.3%\par CMP WNL\par LDL 77, , TG 85\par Urine microalb ratio negative at 7.4  (normal < 30)\par TSH 3.16 uU/ml  (0.45-4.5)\par 25-D level excellent at 47.6 ng/ml

## 2019-05-19 NOTE — PHYSICAL EXAM
[Alert] : alert [Well Nourished] : well nourished [Healthy Appearance] : healthy appearance [EOMI] : extra ocular movement intact [No Lid Lag] : no lid lag [No Proptosis] : no proptosis [Normal Outer Ear/Nose] : the ears and nose were normal in appearance [Normal Hearing] : hearing was normal [No LAD] : no lymphadenopathy [No Neck Mass] : no neck mass was observed [Clear to Auscultation] : lungs were clear to auscultation bilaterally [Regular Rhythm] : with a regular rhythm [Normal Rate] : heart rate was normal  [Carotids Normal] : carotid pulses were normal with no bruits [No Edema] : there was no peripheral edema [Not Tender] : non-tender [No HSM] : no hepato-splenomegaly [Soft] : abdomen soft [Normal] : normal and non tender [No CVA Tenderness] : no ~M costovertebral angle tenderness [Normal Gait] : normal gait [No Joint Swelling] : no joint swelling seen [No Involuntary Movements] : no involuntary movements were seen [Normal Strength/Tone] : muscle strength and tone were normal [No Rash] : no rash [No Tremors] : no tremors [Normal Sensation on Monofilament Testing] : normal sensation on monofilament testing of lower extremities [Normal Affect] : the affect was normal [Normal Mood] : the mood was normal [Foot Ulcers] : no foot ulcers [Hirsutism] : no hirsutism [Kyphosis] : no kyphosis present [de-identified] : Thyroidectomy scar.  No palpable thyroid tissue [Acanthosis Nigricans] : no acanthosis nigricans [de-identified] : Pupils miotic.  Moderate corneal arcus without xanthelasma.  Mild conjunctival injection [de-identified] : No cervical or supraclavicular adenopathy [de-identified] : 2 cm cystic lesion on the dorsum of the R hand with an ecchymosis on the surrounding skin [de-identified] : Short OLIVER localized to the aortic area [de-identified] : DP pulses 3+ on the left, 2+ on the right.   [de-identified] : Vibratory sensation mildly decreased over the toes [de-identified] : No scalp hair thinning

## 2019-05-19 NOTE — ASSESSMENT
[FreeTextEntry2] : Increased post-prandial FS monitoring and targets [FreeTextEntry1] : 1) Type 2 DM:  Glycemic control is excellent as assessed by A1c level, though would prefer that this be supplemented with increased fingerstick monitoring after lunch and supper.  Her readings after breakfast appear to be mostly in target range despite her rather large carbohydrate intake at the meal.  \par --Continue metformin- mg BID\par --Increase post-prandial FS monitoring and log results--(including food intake for meals which result in elevated post-prandial glucoses).  \par \par 2) Hypothyroidism:  Current TSH target is 0.4-1.0 uU/ml--no longer aiming for the suppressed range given the pt's age and long disease-free interval.  Her current TSH level is therefore distinctly above goal.\par --To increase the levothyroxine dose back to 88 mcg 7 days/week.\par \par 3) Papillary thyroid CA:  Pt is now 19 years post completion thyroidectomy and radiodiodine ablation, and has remained free of recurrent or metastatic disease.  Thyroglobulin levels have been undetectable and ultrasounds negative.\par --Obtain a updated ultrasound (at [facility]) prior to her next visit\par \par 4) Hyperlipidemia:  LDL-cholesterol is only slightly above her target of 70 mg%.\par --Continue pravastatin 40 mg/day\par \par 5) Hypertension:  Systolic BP is slightly elevated on today's exam, but the pt's readings at home have been mostly in target range.\par --Continue HCTZ and losartan\par \par Given the pt's epigastric discomfort and the findings on EGD, suggested that she discontinue the baby aspirin--no clear benefit from the aspirin in her case in the absence of documented CVD.\par Also suggested that she discontinue the fish oil--no clear benefit from this either\par \par See for follow-up in 4 months.  CMP, lipids, A1c, TFTs, thyroglobulin level, microalb before the visit.\par Thyroid ultrasound (at [facility]) before the visit

## 2019-05-19 NOTE — HISTORY OF PRESENT ILLNESS
[FreeTextEntry1] : 69-year-old woman who is followed for type 2 DM, hyperlipidemia, hypertension, thyroid CA and post-surgical hypothyroidism.  She is s/p partial left thyroid lobectomy in 1969, followed by a completion thyroidectomy in 2000.  The pathology after the latter surgery showed follicular variant of papillary CA, without any local extension or vascular invasion.  She received 75 mCi I-131 post-op to ablate a large left lobe remnant.  She has been free of any recurrent or metastatic disease since that time.  Her diabetes was diagnosed in December of 2016, having progressed after a 3-year history of pre-diabetes. \par \par Has been monitoring fingersticks somewhat more frequently.  Pre-breakfast values have been , She has tested several readings after breakfast, most of which have been < 150, but she had one spike to 194 mg% which she is unable to explain in terms of her diet that day.  She has not done any testing after lunch or supper.\par Current diet:\par --Breakfast is oatmeal plus a whole sweet potato.\par --Lunch seems to be soup, steamed vegetables and a "little bit" of rice\par --Dinner is small--milk and crackers\par --Intake of cake and cookies is minimal, but fruit intake may be excessive.\michael Developed upper abdominal pain a few months ago, and was referred to a gastroenterologist.  Says that her upper endoscopy was negative, but the report actually indicates mild gastritis, and she was probably started on a PPI.  (She does not know the name of the medication, but takes it once a day before breakfast).\par Recent mammogram also showed a breast cyst for which aspiration was suggested, but she is not sure that she wants to have the procedure.\par Recent ophth exam was negative for retinopathy\michael Denies any numbness or paresthesias in her feet\par Medications reviewed--Enalapril was changed to losartan 50 mg/day because of cough.\par BPs checked at home seem to be < 130/80, but she had one spike to approximately 160/100 and went to the local ER because of her anxiety about this.  No changes in medication were made.

## 2019-05-19 NOTE — REVIEW OF SYSTEMS
[Polydipsia] : polydipsia [Easy Bruising] : a tendency for easy bruising [Fever] : no fever [Chills] : no chills [Fatigue] : no fatigue [Decreased Appetite] : appetite not decreased [Dry Eyes] : no dryness of the eyes [Eyes Itch] : no itching of the eyes [Blurry Vision] : no blurred vision [Hearing Loss] : no hearing loss  [Dysphagia] : no dysphagia [Lower Ext Edema] : no lower extremity edema [Chest Pain] : no chest pain [Palpitations] : no palpitations [Wheezing] : no wheezing was heard [Shortness Of Breath] : no shortness of breath [Constipation] : no constipation [SOB on Exertion] : no shortness of breath during exertion [Nausea] : no nausea [Heartburn] : no heartburn [Diarrhea] : no diarrhea [Polyuria] : no polyuria [Joint Stiffness] : no joint stiffness [Joint Pain] : no joint pain [Dysuria] : no dysuria [Hair Loss] : no hair loss [Myalgia] : no myalgia  [Hirsutism] : no hirsutism [Muscle Cramps] : no muscle cramps [Dry Skin] : no dry skin [Headache] : no headaches [Tremors] : no tremors [Difficulty Walking] : no difficulty walking [Pain/Numbness of Digits] : no pain/numbness of digits [Depression] : no depression [Anxiety] : no anxiety [Stress] : no stress [Insomnia] : no insomnia [Heat Intolerance] : heat tolerant [Cold Intolerance] : cold tolerant [FreeTextEntry2] : Lost 2 lb since her last visit [Easy Bleeding] : no ~M tendency for easy bleeding [FreeTextEntry4] : Has discomfort in both ears and chronic nasal congestion--"I always have a cold" [FreeTextEntry6] : Still has a mildly productive cough, even after the change to losartan [FreeTextEntry8] : Nocturia 3-4X/night [FreeTextEntry7] : Epigastric discomfort has improved on her current medication

## 2019-08-14 ENCOUNTER — FORM ENCOUNTER (OUTPATIENT)
Age: 69
End: 2019-08-14

## 2019-08-15 ENCOUNTER — OUTPATIENT (OUTPATIENT)
Dept: OUTPATIENT SERVICES | Facility: HOSPITAL | Age: 69
LOS: 1 days | End: 2019-08-15

## 2019-08-15 ENCOUNTER — APPOINTMENT (OUTPATIENT)
Dept: ULTRASOUND IMAGING | Facility: CLINIC | Age: 69
End: 2019-08-15
Payer: MEDICARE

## 2019-08-15 DIAGNOSIS — E89.0 POSTPROCEDURAL HYPOTHYROIDISM: Chronic | ICD-10-CM

## 2019-08-15 PROCEDURE — 76536 US EXAM OF HEAD AND NECK: CPT | Mod: 26

## 2019-09-05 ENCOUNTER — MEDICATION RENEWAL (OUTPATIENT)
Age: 69
End: 2019-09-05

## 2019-09-18 ENCOUNTER — APPOINTMENT (OUTPATIENT)
Dept: ENDOCRINOLOGY | Facility: CLINIC | Age: 69
End: 2019-09-18
Payer: MEDICARE

## 2019-09-18 VITALS
TEMPERATURE: 98.6 F | OXYGEN SATURATION: 97 % | WEIGHT: 134 LBS | SYSTOLIC BLOOD PRESSURE: 140 MMHG | BODY MASS INDEX: 22.88 KG/M2 | DIASTOLIC BLOOD PRESSURE: 85 MMHG | HEART RATE: 78 BPM | HEIGHT: 64 IN

## 2019-09-18 PROCEDURE — 99214 OFFICE O/P EST MOD 30 MIN: CPT

## 2019-09-22 NOTE — PHYSICAL EXAM
[Well Nourished] : well nourished [Alert] : alert [Normal Sclera/Conjunctiva] : normal sclera/conjunctiva [Healthy Appearance] : healthy appearance [EOMI] : extra ocular movement intact [No Lid Lag] : no lid lag [No Proptosis] : no proptosis [Normal Hearing] : hearing was normal [Normal Outer Ear/Nose] : the ears and nose were normal in appearance [No Neck Mass] : no neck mass was observed [No LAD] : no lymphadenopathy [Normal Rate] : heart rate was normal  [Clear to Auscultation] : lungs were clear to auscultation bilaterally [Regular Rhythm] : with a regular rhythm [Carotids Normal] : carotid pulses were normal with no bruits [No Edema] : there was no peripheral edema [Not Tender] : non-tender [Soft] : abdomen soft [No HSM] : no hepato-splenomegaly [No CVA Tenderness] : no ~M costovertebral angle tenderness [Normal] : normal and non tender [Normal Gait] : normal gait [No Joint Swelling] : no joint swelling seen [No Involuntary Movements] : no involuntary movements were seen [Normal Strength/Tone] : muscle strength and tone were normal [No Rash] : no rash [No Tremors] : no tremors [Normal Sensation on Monofilament Testing] : normal sensation on monofilament testing of lower extremities [Normal Affect] : the affect was normal [Normal Mood] : the mood was normal [Foot Ulcers] : no foot ulcers [Kyphosis] : no kyphosis present [Hirsutism] : no hirsutism [Acanthosis Nigricans] : no acanthosis nigricans [de-identified] : Pupils severely miotic.  Dense corneal arcus without xanthelasma [de-identified] : Short mid-systolic murmur at the apex and LSB [de-identified] : Thyroidectomy scar.  No palpable thyroid tissue [de-identified] : DP pulses 3+ bilaterally [de-identified] : Vibratory sensation moderately decreased over the toes [de-identified] : No cervical or supraclavicular adenopathy

## 2019-09-22 NOTE — HISTORY OF PRESENT ILLNESS
[FreeTextEntry1] : 69-year-old woman who is followed for type 2 DM, hyperlipidemia, hypertension, papillary thyroid CA and post-surgical hypothyroidism.  She is s/p partial left thyroid lobectomy in 1969, followed by a completion thyroidectomy in 2000.  The pathology after the latter surgery showed follicular variant of papillary CA, without any local extension or vascular invasion.  She received 75 mCi I-131 post-op to ablate a large left lobe remnant.  She has been free of any recurrent or metastatic disease since that time.  Her diabetes was diagnosed in December of 2016, having progressed after a 3-year history of pre-diabetes. \par \michael Has not had any significant medical issues since her last visit, and has no new physical complaints.\michael Has been testing fingersticks twice a day, mostly before and 2 hours after breakfast.  The pre-meal values are nearly all in the 100-115 range.  The post-prandials are consistently < 150 mg% except on a few occasions during the past month--once when she added a full banana to the meal, once when she had a small chocolate bar, and once when she had three vegetable rolls (similar to small burritos).  Otherwise her breakfast is either one or two slices of bread with peanut butter.\par She usually has only one other meal a day--her main meal in the late afternoon. Protein at that meal is chicken, fish or pork.  Starch is usually rice ("a little").  She tends to snack on nuts later at night.\michael Has lost 15 lb since her last visit.  Attributes this to increased exercise--seems to be doing calisthenics at home, spends at least a few hours a day doing this.  \michael BPs checked at home seem to be higher in the morning (often 140/80 +/-) than later in the day (< 130/75).  She takes her losartan at night.\par Thyroid ultrasound done at [facility] on 8/15/19 was negative for recurrent disease in the thyroid bed or pathologic adenopathy.  (The previously seen nodule in the left thyroid bed was not evident on the current study)\par She did not yet have the breast cyst aspirated.  She is due for a repeat ultrasound at the beginning of October.\michael Has not had a recent ophth exam, but intends to go.\par Denies any numbness or paresthesias in her feet.

## 2019-09-22 NOTE — REVIEW OF SYSTEMS
[Fatigue] : no fatigue [Decreased Appetite] : appetite not decreased [Fever] : no fever [Blurry Vision] : no blurred vision [Chills] : no chills [Dry Eyes] : no dryness of the eyes [Eyes Itch] : no itching of the eyes [Dysphagia] : no dysphagia [Hearing Loss] : no hearing loss  [Palpitations] : no palpitations [Chest Pain] : no chest pain [Lower Ext Edema] : no lower extremity edema [Shortness Of Breath] : no shortness of breath [Cough] : no cough [Wheezing] : no wheezing was heard [Nausea] : no nausea [SOB on Exertion] : no shortness of breath during exertion [Constipation] : no constipation [Diarrhea] : no diarrhea [Polyuria] : no polyuria [Heartburn] : no heartburn [Joint Pain] : no joint pain [Dysuria] : no dysuria [Joint Stiffness] : no joint stiffness [Muscle Cramps] : no muscle cramps [Myalgia] : no myalgia  [Hirsutism] : no hirsutism [Hair Loss] : no hair loss [Dry Skin] : no dry skin [Headache] : no headaches [Pain/Numbness of Digits] : no pain/numbness of digits [Tremors] : no tremors [Difficulty Walking] : no difficulty walking [Depression] : no depression [Anxiety] : no anxiety [Polydipsia] : no polydipsia [Heat Intolerance] : heat tolerant [Cold Intolerance] : cold tolerant [Easy Bleeding] : no ~M tendency for easy bleeding [Easy Bruising] : no tendency for easy bruising [FreeTextEntry2] : Has lost 15 lb since her last visit [de-identified] : Mild family-related stress which sometimes keeps her up at night.  Usually sleeps only 4 hours a night but sometimes naps during the day [FreeTextEntry8] : Nocturia 1-2X/night

## 2019-09-22 NOTE — DATA REVIEWED
[FreeTextEntry1] : [facility]  (9/11/19)\par \par FBS 91,  A1c 6.2%\par CMP WNL\par LDL 96, , TG 62\par Urine microalb ratio negative at 9.2  (normal < 30)\par TSH 0.46 uU/ml  (0.45-4.5)\par Serum thyroglobulin undetectable (< 0.1 ng/ml)

## 2019-09-22 NOTE — ASSESSMENT
[FreeTextEntry1] : 1) Type 2 DM:  A1c level is in an excellent range, but somewhat higher than expected for the pt's fingerstick results.  Her monitoring is limited to pre-and post-breakfast values, however, and it is likely that most of the undetected hyperglycemia is occurring after her main meal later in the day.\par --Continue metformin\par --Suggested that she change her monitoring schedule to pre and post-supper, or simply post breakfast and post-supper.\par --Diet reinforced (glo with regard to portions of fruit and rice)\par \par 2) Post-surgical hypothyroidism:  Despite her history of thyroid CA, will no longer aim for a TSH level in the suppressed range given her long disease-free interval, her low-risk tumor, and her age.  A TSH level in the lower part of the normal range would be optimal, and her current value of 0.46 uU/ml is fine.\par --Continue levothyroxine 88 mcg 6 days/week, 44 mcg 1 day/week\par \par 3) Papillary thyroid CA: The current ultrasound is negative for recurrent disease or pathologic adenopathy, and her thyroglobulin level is undetectable. \par --Continue yearly monitoring\par \par 4) Hyperlipidemia:  LDL-cholesterol is below her goal level of 100 mg%.\par --Continue pravastatin 40 mg/day\par \par 5) Hypertension:  BP is at the upper limit of the target range on today's exam, but her readings at home have been satisfactory.\par --Continue losartan and HCTZ\par \par See for follow-up in 4 months.  CMP, lipids, A1c, TFTs, CBC, 25-D before the visit [FreeTextEntry2] : Diet, timing of fingerstick monitoring

## 2019-12-18 ENCOUNTER — MEDICATION RENEWAL (OUTPATIENT)
Age: 69
End: 2019-12-18

## 2020-01-17 ENCOUNTER — APPOINTMENT (OUTPATIENT)
Dept: ENDOCRINOLOGY | Facility: CLINIC | Age: 70
End: 2020-01-17

## 2020-05-10 ENCOUNTER — EMERGENCY (EMERGENCY)
Facility: HOSPITAL | Age: 70
LOS: 0 days | Discharge: HOME | End: 2020-05-11
Attending: EMERGENCY MEDICINE | Admitting: EMERGENCY MEDICINE
Payer: MEDICARE

## 2020-05-10 VITALS
OXYGEN SATURATION: 100 % | HEART RATE: 60 BPM | DIASTOLIC BLOOD PRESSURE: 67 MMHG | SYSTOLIC BLOOD PRESSURE: 152 MMHG | RESPIRATION RATE: 20 BRPM | TEMPERATURE: 98 F

## 2020-05-10 DIAGNOSIS — R07.89 OTHER CHEST PAIN: ICD-10-CM

## 2020-05-10 DIAGNOSIS — E89.0 POSTPROCEDURAL HYPOTHYROIDISM: Chronic | ICD-10-CM

## 2020-05-10 DIAGNOSIS — R06.02 SHORTNESS OF BREATH: ICD-10-CM
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
## 2020-05-10 DIAGNOSIS — R51 HEADACHE: ICD-10-CM

## 2020-05-10 DIAGNOSIS — Z20.828 CONTACT WITH AND (SUSPECTED) EXPOSURE TO OTHER VIRAL COMMUNICABLE DISEASES: ICD-10-CM

## 2020-05-10 DIAGNOSIS — R42 DIZZINESS AND GIDDINESS: ICD-10-CM
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
## 2020-05-10 LAB
ALBUMIN SERPL ELPH-MCNC: 4.7 G/DL — SIGNIFICANT CHANGE UP (ref 3.5–5.2)
ALP SERPL-CCNC: 74 U/L — SIGNIFICANT CHANGE UP (ref 30–115)
ALT FLD-CCNC: 14 U/L — SIGNIFICANT CHANGE UP (ref 0–41)
ANION GAP SERPL CALC-SCNC: 12 MMOL/L — SIGNIFICANT CHANGE UP (ref 7–14)
ANION GAP SERPL CALC-SCNC: 16 MMOL/L — HIGH (ref 7–14)
AST SERPL-CCNC: 39 U/L — SIGNIFICANT CHANGE UP (ref 0–41)
BASOPHILS # BLD AUTO: 0.06 K/UL — SIGNIFICANT CHANGE UP (ref 0–0.2)
BASOPHILS NFR BLD AUTO: 1.1 % — HIGH (ref 0–1)
BILIRUB SERPL-MCNC: 0.3 MG/DL — SIGNIFICANT CHANGE UP (ref 0.2–1.2)
BUN SERPL-MCNC: 16 MG/DL — SIGNIFICANT CHANGE UP (ref 10–20)
BUN SERPL-MCNC: 20 MG/DL — SIGNIFICANT CHANGE UP (ref 10–20)
CALCIUM SERPL-MCNC: 9 MG/DL — SIGNIFICANT CHANGE UP (ref 8.5–10.1)
CALCIUM SERPL-MCNC: 9.4 MG/DL — SIGNIFICANT CHANGE UP (ref 8.5–10.1)
CHLORIDE SERPL-SCNC: 104 MMOL/L — SIGNIFICANT CHANGE UP (ref 98–110)
CHLORIDE SERPL-SCNC: 104 MMOL/L — SIGNIFICANT CHANGE UP (ref 98–110)
CO2 SERPL-SCNC: 22 MMOL/L — SIGNIFICANT CHANGE UP (ref 17–32)
CO2 SERPL-SCNC: 27 MMOL/L — SIGNIFICANT CHANGE UP (ref 17–32)
CREAT SERPL-MCNC: 0.8 MG/DL — SIGNIFICANT CHANGE UP (ref 0.7–1.5)
CREAT SERPL-MCNC: 1.1 MG/DL — SIGNIFICANT CHANGE UP (ref 0.7–1.5)
EOSINOPHIL # BLD AUTO: 0.28 K/UL — SIGNIFICANT CHANGE UP (ref 0–0.7)
EOSINOPHIL NFR BLD AUTO: 5 % — SIGNIFICANT CHANGE UP (ref 0–8)
GLUCOSE SERPL-MCNC: 126 MG/DL — HIGH (ref 70–99)
GLUCOSE SERPL-MCNC: 99 MG/DL — SIGNIFICANT CHANGE UP (ref 70–99)
HCT VFR BLD CALC: 35.5 % — LOW (ref 37–47)
HGB BLD-MCNC: 12 G/DL — SIGNIFICANT CHANGE UP (ref 12–16)
IMM GRANULOCYTES NFR BLD AUTO: 0.2 % — SIGNIFICANT CHANGE UP (ref 0.1–0.3)
LYMPHOCYTES # BLD AUTO: 1.65 K/UL — SIGNIFICANT CHANGE UP (ref 1.2–3.4)
LYMPHOCYTES # BLD AUTO: 29.7 % — SIGNIFICANT CHANGE UP (ref 20.5–51.1)
MAGNESIUM SERPL-MCNC: 2.2 MG/DL — SIGNIFICANT CHANGE UP (ref 1.8–2.4)
MCHC RBC-ENTMCNC: 31.6 PG — HIGH (ref 27–31)
MCHC RBC-ENTMCNC: 33.8 G/DL — SIGNIFICANT CHANGE UP (ref 32–37)
MCV RBC AUTO: 93.4 FL — SIGNIFICANT CHANGE UP (ref 81–99)
MONOCYTES # BLD AUTO: 0.61 K/UL — HIGH (ref 0.1–0.6)
MONOCYTES NFR BLD AUTO: 11 % — HIGH (ref 1.7–9.3)
NEUTROPHILS # BLD AUTO: 2.95 K/UL — SIGNIFICANT CHANGE UP (ref 1.4–6.5)
NEUTROPHILS NFR BLD AUTO: 53 % — SIGNIFICANT CHANGE UP (ref 42.2–75.2)
NRBC # BLD: 0 /100 WBCS — SIGNIFICANT CHANGE UP (ref 0–0)
PLATELET # BLD AUTO: 179 K/UL — SIGNIFICANT CHANGE UP (ref 130–400)
POTASSIUM SERPL-MCNC: 4 MMOL/L — SIGNIFICANT CHANGE UP (ref 3.5–5)
POTASSIUM SERPL-MCNC: 5.4 MMOL/L — HIGH (ref 3.5–5)
POTASSIUM SERPL-SCNC: 4 MMOL/L — SIGNIFICANT CHANGE UP (ref 3.5–5)
POTASSIUM SERPL-SCNC: 5.4 MMOL/L — HIGH (ref 3.5–5)
PROT SERPL-MCNC: 7.3 G/DL — SIGNIFICANT CHANGE UP (ref 6–8)
RBC # BLD: 3.8 M/UL — LOW (ref 4.2–5.4)
RBC # FLD: 13.9 % — SIGNIFICANT CHANGE UP (ref 11.5–14.5)
SARS-COV-2 RNA SPEC QL NAA+PROBE: SIGNIFICANT CHANGE UP
SODIUM SERPL-SCNC: 142 MMOL/L — SIGNIFICANT CHANGE UP (ref 135–146)
SODIUM SERPL-SCNC: 143 MMOL/L — SIGNIFICANT CHANGE UP (ref 135–146)
TROPONIN T SERPL-MCNC: <0.01 NG/ML — SIGNIFICANT CHANGE UP
TROPONIN T SERPL-MCNC: <0.01 NG/ML — SIGNIFICANT CHANGE UP
WBC # BLD: 5.56 K/UL — SIGNIFICANT CHANGE UP (ref 4.8–10.8)
WBC # FLD AUTO: 5.56 K/UL — SIGNIFICANT CHANGE UP (ref 4.8–10.8)

## 2020-05-10 PROCEDURE — 72170 X-RAY EXAM OF PELVIS: CPT | Mod: 26

## 2020-05-10 PROCEDURE — 70450 CT HEAD/BRAIN W/O DYE: CPT | Mod: 26

## 2020-05-10 PROCEDURE — 71045 X-RAY EXAM CHEST 1 VIEW: CPT | Mod: 26

## 2020-05-10 PROCEDURE — 93010 ELECTROCARDIOGRAM REPORT: CPT

## 2020-05-10 PROCEDURE — 99220: CPT | Mod: CS

## 2020-05-10 RX ORDER — ASPIRIN/CALCIUM CARB/MAGNESIUM 324 MG
325 TABLET ORAL ONCE
Refills: 0 | Status: COMPLETED | OUTPATIENT
Start: 2020-05-10 | End: 2020-05-10

## 2020-05-10 RX ORDER — SODIUM CHLORIDE 9 MG/ML
1000 INJECTION INTRAMUSCULAR; INTRAVENOUS; SUBCUTANEOUS ONCE
Refills: 0 | Status: COMPLETED | OUTPATIENT
Start: 2020-05-10 | End: 2020-05-10

## 2020-05-10 RX ORDER — MECLIZINE HCL 12.5 MG
50 TABLET ORAL ONCE
Refills: 0 | Status: COMPLETED | OUTPATIENT
Start: 2020-05-10 | End: 2020-05-10

## 2020-05-10 RX ORDER — LEVOTHYROXINE SODIUM 125 MCG
88 TABLET ORAL DAILY
Refills: 0 | Status: DISCONTINUED | OUTPATIENT
Start: 2020-05-10 | End: 2020-05-11

## 2020-05-10 RX ORDER — METOCLOPRAMIDE HCL 10 MG
10 TABLET ORAL ONCE
Refills: 0 | Status: COMPLETED | OUTPATIENT
Start: 2020-05-10 | End: 2020-05-10

## 2020-05-10 RX ORDER — MECLIZINE HCL 12.5 MG
25 TABLET ORAL EVERY 8 HOURS
Refills: 0 | Status: DISCONTINUED | OUTPATIENT
Start: 2020-05-10 | End: 2020-05-11

## 2020-05-10 RX ORDER — HYDROCHLOROTHIAZIDE 25 MG
25 TABLET ORAL DAILY
Refills: 0 | Status: DISCONTINUED | OUTPATIENT
Start: 2020-05-10 | End: 2020-05-11

## 2020-05-10 RX ORDER — METFORMIN HYDROCHLORIDE 850 MG/1
500 TABLET ORAL
Refills: 0 | Status: DISCONTINUED | OUTPATIENT
Start: 2020-05-10 | End: 2020-05-11

## 2020-05-10 RX ORDER — LOSARTAN POTASSIUM 100 MG/1
50 TABLET, FILM COATED ORAL DAILY
Refills: 0 | Status: DISCONTINUED | OUTPATIENT
Start: 2020-05-10 | End: 2020-05-11

## 2020-05-10 RX ADMIN — Medication 10 MILLIGRAM(S): at 19:23

## 2020-05-10 RX ADMIN — Medication 50 MILLIGRAM(S): at 19:23

## 2020-05-10 RX ADMIN — SODIUM CHLORIDE 1000 MILLILITER(S): 9 INJECTION INTRAMUSCULAR; INTRAVENOUS; SUBCUTANEOUS at 18:02

## 2020-05-10 RX ADMIN — Medication 325 MILLIGRAM(S): at 20:35

## 2020-05-10 NOTE — ED PROVIDER NOTE - PROGRESS NOTE DETAILS
TC: 69 yo F with PMHx TC: 71 yo F with PMHx of DM, HLD, glaucoma, HTN, thyroid cancer s/p thyroidectomy/radiation who presents with vertigo like dizziness. Here in ED, vitals wnl. Mild nystagmus on exam, no focal neuro deficits. Pt ambulated without ataxia but became nauseous and vomited after. Ordered labs, ekg, cxr. Given IVF, reglan, meclizine. Will reassess. TC: Labs wnl. Ekg unremarkable. Trop negative. However pt now stating that sob is new and persistent. Will obtain serial trop. Ordered CT head, xray hip. TC: No acute fx or dislocation on xray. No cardiopulmonary disease on cxr. Pending CT head. TC: Reassessed pt, reports that dizziness improved. CT head negative per my read. Will place in obs for ACS workup.

## 2020-05-10 NOTE — ED ADULT NURSE NOTE - OBJECTIVE STATEMENT
patient reassess from previous RN.  Patient complaints of dizziness and that when she stood up she fell on her left hip and hit her head on the door as she fell. Patient deneis n/v.  Patient complaints of chest pressure and SOB.  Patient denies cough, fever, and is not on home oxygen.  Patient reports bruising to her left hip.

## 2020-05-10 NOTE — ED ADULT NURSE REASSESSMENT NOTE - NS ED NURSE REASSESS COMMENT FT1
Pt reassessed A/o times 4 Vs stable report filling slight better , no N/V no dizziness VS retaken stable pt is seen evaluate by ED attending clear to go home with her robinsoner verbilize understanding of instruction given , Pt waiting for  her doughtier to go home .

## 2020-05-10 NOTE — ED CDU PROVIDER INITIAL DAY NOTE - PHYSICAL EXAMINATION
CONSTITUTIONAL: Well-appearing; well-nourished; in no apparent distress.   EYES: PERRL; EOM intact.   ENT: normal nose; no rhinorrhea; normal pharynx with no tonsillar hypertrophy.   NECK: Supple; non-tender; no cervical lymphadenopathy. No JVD.   CARDIOVASCULAR: Normal S1, S2; no murmurs, rubs, or gallops.   RESPIRATORY: Normal chest excursion with respiration; breath sounds clear and equal bilaterally; no wheezes, rhonchi, or rales.  GI/: Normal bowel sounds; non-distended; non-tender; no palpable organomegaly.   MS: No calf swelling and tenderness.   SKIN: Normal for age and race; warm; dry; good turgor; no apparent lesions or exudate.   NEURO/PSYCH: A & O x 4; CN II-XII grossly unremarkable. no drifting. strength equal to b/l upper and lower extremities. speaking coherently. nml cerebellum test.

## 2020-05-10 NOTE — ED CDU PROVIDER INITIAL DAY NOTE - OBJECTIVE STATEMENT
71 yo female hx of HTN/HDL/DM/Hypothyroid/vertigo placed in obs for chest pain and sob. as per patient, she woke up with dizziness which is not unusual to her. Around 12 pm, she started feeling dizziness again and had brief chest pain and sob. Continue not feeling well so she came to ED for evaluation. dizziness resolved after medication. Denies HA/Dizziness/slur speech/extremities weakness and numbness/ facial numbness and weakness. Denies Fever/chill/recent illness/coughing/abd pain/n/v/d/extremities pain/urinary sxs.

## 2020-05-10 NOTE — ED ADULT NURSE REASSESSMENT NOTE - NS ED NURSE REASSESS COMMENT FT1
patient admitted to observation for further observation for dizziness, SOB and fall at home. Patient A&Ox4, ambulates with a  steady gait. Patient denies any chest pain, n/v and no SOB noted.  Patient IV access patent and flushes with no difficulty.  Patient remains on cardiac monitor.  Will continue to monitor.

## 2020-05-10 NOTE — ED PROVIDER NOTE - CARE PLAN
Principal Discharge DX:	Dizziness  Secondary Diagnosis:	Fall at home, initial encounter  Secondary Diagnosis:	Shortness of breath Principal Discharge DX:	Dizziness  Goal:	concern for ACS  Secondary Diagnosis:	Fall at home, initial encounter  Secondary Diagnosis:	Shortness of breath

## 2020-05-10 NOTE — ED CDU PROVIDER INITIAL DAY NOTE - NS ED ROS FT
Constitutional: no fever, chills, no recent weight loss, change in appetite or malaise  Eyes: no redness/discharge/pain/vision changes  ENT: no rhinorrhea/ear pain/sore throat  Cardiac: see HPI  Respiratory: No cough or respiratory distress  GI: No nausea, vomiting, diarrhea or abdominal pain.  : No dysuria, frequency, urgency or hematuria  MS: no pain to back or extremities, no loss of ROM, no weakness  Neuro: + dizziness No headache or weakness. No LOC.  Skin: No skin rash.  Endocrine: No history of thyroid disease or diabetes.

## 2020-05-10 NOTE — ED CDU PROVIDER INITIAL DAY NOTE - ATTENDING CONTRIBUTION TO CARE
71 yo F htn, hld, dm, in obs for CP/cardiac w/u.  pt had a trauma to her head, felt dizzy afterwards with brief period of cp/sob. no symptoms at present.  vss, nontoxic, well appearing, pink conj, anicteric, MMM, neck supple, CTAB, RRR, equal radial pulses bilat, abd soft/nt/nd, no cva tend. no calves tend, no edema, no fnd. no rashes.

## 2020-05-10 NOTE — ED PROVIDER NOTE - PHYSICAL EXAMINATION
CONSTITUTIONAL: well developed, well nourished, in no acute distress, speaking in full sentences, nontoxic appearing  SKIN: warm, dry, no rash  HEAD: normocephalic, atraumatic  EYES: PERRL at 3mm, EOMI, no conjunctival erythema, mild horizontal nystagmus  ENT: patent airway, moist mucous membranes, no tongue deviation  NECK: supple, no masses, full flexion/extension without pain  CV:  regular rate, regular rhythm, 2+ radial pulses bilaterally  RESP: no wheezes, no rales, no rhonchi, normal work of breathing  ABD: soft, nontender, nondistended, no rebound, no guarding  MSK: normal ROM, no cyanosis, no edema  NEURO: alert, oriented, CN 2-12 grossly intact, sensation intact to light touch symmetrically, 5/5 motor strength in all extremities, normal finger to nose, no pronator drift, no facial asymmetry, slowed gait, no ataxia  PSYCH: cooperative, appropriate
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
## 2020-05-10 NOTE — ED PROVIDER NOTE - ATTENDING CONTRIBUTION TO CARE
70yoF with h/o HTN, HLD, DM, glaucoma, vertigo, presents with dizziness and SOB. Primary complaint is actually dizziness. She has a long h/o dizziness and has had testing with neuro and scheduled for MRI head. Today she had another episode of room spinning and hit the side of her head on a door. On ROS reports some episodes of needing to take a deeper breath over the past 3 days. Denies fever, cough, leg pain or swelling, recent long distance travel, and all other symptoms. On exam, afebrile, hemodynamically stable, saturating well on RA, NAD, well appearing, head NCAT, pupils equal, EOMI, anicteric, MMM, no JVD, RRR, nml S1/S2, no m/r/g, lungs CTAB, no w/r/r, abd soft, NT, ND, nml BS, no rebound or guarding, AAO, CN's 3-12 intact, motor 5/5 and sens symm in all extrems, F-N nml, CUETO spontaneously, no leg cyanosis or edema, skin warm, well perfused, no rashes or hives. No e/o trauma on exam. Character low suspicion for PE and no associated signs of DVT or tachycardia/hypoxia. Character low suspicion for ACS and ECG/trop unremarkable however has not had provocative testing in 10 yrs and high risk for this and will obs for this. No arrhythmia. Character of dizziness of low suspicion for CVA and no focal or localizing findings and had has extensive w/u for this already. Given fluids, Reglan with ________. 70yoF with h/o HTN, HLD, DM, glaucoma, vertigo, presents with dizziness and SOB. Primary complaint is actually dizziness. She has a long h/o dizziness and has had testing with neuro and scheduled for MRI head. Today she had another episode of room spinning and hit the side of her head on a door. On ROS reports some episodes of needing to take a deeper breath over the past 3 days. Denies fever, cough, leg pain or swelling, recent long distance travel, and all other symptoms. On exam, afebrile, hemodynamically stable, saturating well on RA, NAD, well appearing, head NCAT, pupils equal, EOMI, anicteric, MMM, no JVD, RRR, nml S1/S2, no m/r/g, lungs CTAB, no w/r/r, abd soft, NT, ND, nml BS, no rebound or guarding, AAO, CN's 3-12 intact, motor 5/5 and sens symm in all extrems, F-N nml, CUETO spontaneously, no leg cyanosis or edema, skin warm, well perfused, no rashes or hives. No e/o trauma on exam. Character low suspicion for PE and no associated signs of DVT or tachycardia/hypoxia. Character low suspicion for ACS and ECG/trop unremarkable however has not had provocative testing in 10 yrs and high risk for this and will obs for this. No arrhythmia. Character of dizziness of low suspicion for CVA and no focal or localizing findings and had has extensive w/u for this already. Given fluids, Reglan with improvement. Given ASA. Patient well appearing, hemodynamically stable. Sent to obs for CTCA and Echo.

## 2020-05-10 NOTE — ED PROVIDER NOTE - NS ED ROS FT
GEN:  no fever, no chills, + generalized weakness  NEURO: + chronic headache, + dizziness  ENT: no sore throat, no runny nose  CV:  + chest pain, no palpitations  RESP:  + sob, no cough  GI:  + nausea, no vomiting, no abdominal pain, no diarrhea  :  no dysuria, no urinary frequency, no hematuria  MSK:  no joint pain, no edema  SKIN:  no rash, no bruising  HEME: no hematochezia, no melena

## 2020-05-11 VITALS
DIASTOLIC BLOOD PRESSURE: 82 MMHG | SYSTOLIC BLOOD PRESSURE: 143 MMHG | TEMPERATURE: 98 F | RESPIRATION RATE: 16 BRPM | OXYGEN SATURATION: 99 % | HEART RATE: 51 BPM

## 2020-05-11 PROCEDURE — 93018 CV STRESS TEST I&R ONLY: CPT

## 2020-05-11 PROCEDURE — 78452 HT MUSCLE IMAGE SPECT MULT: CPT | Mod: 26

## 2020-05-11 PROCEDURE — 93016 CV STRESS TEST SUPVJ ONLY: CPT

## 2020-05-11 PROCEDURE — 93306 TTE W/DOPPLER COMPLETE: CPT | Mod: 26

## 2020-05-11 PROCEDURE — 99217: CPT | Mod: CS

## 2020-05-11 RX ORDER — ADENOSINE 3 MG/ML
60 INJECTION INTRAVENOUS ONCE
Refills: 0 | Status: COMPLETED | OUTPATIENT
Start: 2020-05-11 | End: 2020-05-11

## 2020-05-11 RX ADMIN — Medication 25 MILLIGRAM(S): at 06:40

## 2020-05-11 RX ADMIN — ADENOSINE 40 MILLIGRAM(S): 3 INJECTION INTRAVENOUS at 10:39

## 2020-05-11 RX ADMIN — Medication 88 MICROGRAM(S): at 06:40

## 2020-05-11 RX ADMIN — LOSARTAN POTASSIUM 50 MILLIGRAM(S): 100 TABLET, FILM COATED ORAL at 06:40

## 2020-05-11 RX ADMIN — METFORMIN HYDROCHLORIDE 500 MILLIGRAM(S): 850 TABLET ORAL at 07:45

## 2020-05-11 NOTE — ED ADULT NURSE REASSESSMENT NOTE - NS ED NURSE REASSESS COMMENT FT1
patient sleeping and remains on cardiac monitor.  Patient in stable condition and nad.  Will continue to monitor.

## 2020-05-11 NOTE — ED CDU PROVIDER DISPOSITION NOTE - PATIENT PORTAL LINK FT
You can access the FollowMyHealth Patient Portal offered by [facility] by registering at the following website: [email or web address]. By joining [employer]’s FollowMyHealth portal, you will also be able to view your health information using other applications (apps) compatible with our system.

## 2020-05-11 NOTE — ED ADULT NURSE REASSESSMENT NOTE - NS ED NURSE REASSESS COMMENT FT1
pt assessed by oncoming RN. pt VSS. pt bradycardic HR 46. PA suzy made aware. pt alert and oriented. denies any cp/sob/dizziness. pt on continuous cardiac monitoring. awaiting stress test. rn will continue to monitor.

## 2020-05-11 NOTE — ED CDU PROVIDER SUBSEQUENT DAY NOTE - PROGRESS NOTE DETAILS
pt seen bedside, NAD, no complaints overnight. pt states she fell and hit her head and brusied the side of her L-hip. pt states after she fell she started to feel dizzy, she had this one time in the past and was worked up for vertigo which she does not have. Negative cardiac enzymes x2 and nl ekg. pt scheduled to go for Pharm Nuc and echo. Will continue to monitor patient.

## 2020-05-11 NOTE — ED CDU PROVIDER DISPOSITION NOTE - CLINICAL COURSE
Patient observed in EDOU.  no ectopy on the monitor. CEx2 negative. EKG x2 no ischemia. Stress test normal. echo reviewed. Will dc with f/u.

## 2020-05-11 NOTE — ED CDU PROVIDER DISPOSITION NOTE - CARE PROVIDER_API CALL
Bj Morrissey  Cardiovascular Disease  [address]  [place] [address]  Phone: [phone]  Fax: [phone]  Follow Up Time:

## 2020-05-11 NOTE — ED ADULT NURSE REASSESSMENT NOTE - NS ED NURSE REASSESS COMMENT FT1
patient sleeping with no difficulty breathing noted.  Patient remains on cardiac monitor.  Patient offers no complaints at this time. Will continue to monitor.

## 2020-05-11 NOTE — ED CDU PROVIDER DISPOSITION NOTE - NSFOLLOWUPINSTRUCTIONS_ED_ALL_ED_FT
follow up PMD and CARDIOLOGY      Syncope    Syncope is when you temporarily lose consciousness, also called fainting or passing out. It is caused by a sudden decrease in blood flow to the brain. Even though most causes of syncope are not dangerous, syncope can possibly be a sign of a serious medical problem. Signs that you may be about to faint include feeling dizzy, lightheaded, nausea, visual changes, or cold/clammy skin. Do not drive, operate heavy machinery, or play sports until your health care provider says it is okay.    SEEK IMMEDIATE MEDICAL CARE IF YOU HAVE ANY OF THE FOLLOWING SYMPTOMS: severe headache, pain in your chest/abdomen/back, bleeding from your mouth or rectum, palpitations, shortness of breath, pain with breathing, seizure, confusion, or trouble walking.

## 2020-05-14 ENCOUNTER — APPOINTMENT (OUTPATIENT)
Dept: ENDOCRINOLOGY | Facility: CLINIC | Age: 70
End: 2020-05-14
Payer: MEDICARE

## 2020-05-14 PROBLEM — H40.9 UNSPECIFIED GLAUCOMA: Chronic | Status: ACTIVE | Noted: 2020-05-10

## 2020-05-14 PROCEDURE — 99443: CPT

## 2020-05-17 RX ORDER — LEVOTHYROXINE SODIUM 0.09 MG/1
88 TABLET ORAL
Qty: 90 | Refills: 3 | Status: DISCONTINUED | COMMUNITY
Start: 2018-01-24 | End: 2020-05-17

## 2020-05-17 NOTE — DATA REVIEWED
[FreeTextEntry1] : [facility]  (5/8/20)\par \par FBS 84, A1c 6.3%\par CBC and CMP WNL\par LDL 87, , TG 62\par TSH 7.59 uU/ml  (0.45-4.5)\par Thyroglobulin level undetectable (< 0.1 ng/ml)\par 25-D level excellent at 49.6 ng/ml\par

## 2020-05-17 NOTE — HISTORY OF PRESENT ILLNESS
[FreeTextEntry1] : 70-year-old woman who is followed for type 2 DM, hyperlipidemia, hypertension, papillary thyroid CA and post-surgical hypothyroidism.  She is s/p partial left thyroid lobectomy in , followed by a completion thyroidectomy in .  The pathology after the latter surgery showed follicular variant of papillary CA, without any local extension or vascular invasion.  She received 75 mCi I-131 post-op to ablate a large left lobe remnant.  She has been free of any recurrent or metastatic disease since that time.  Her diabetes was diagnosed in 2016, having progressed after a 3-year history of pre-diabetes. \par \michael Was seen in the ED at [facility] last week for "dizziness."  Says that she felt OK when she woke up that morning.  Did not have any orthostatic symptoms after first getting OOB (usually waits a few seconds before starting to ambulate) but then became dizzy after taking 3-4 steps and hit her head against the door of the bathroom.  Did not fall down or lose consciousness.  Was able to get back to bed, felt better in a few minutes, and was able to carry out her usual activities later that morning.  She then had a recurrence of the dizziness in the late afternoon, soon after eating.  Her BP was 142/82, and her  at that time.  She called her daughter, who insisted that she come to [place] to go to the ER at [facility].  Work-up there was negative, including a negative head CT.  (She was also kept overnight for observation, and had a stress test the following morning which was negative).  She was then discharged, and she has not had any recurrence of her symptoms.   \par She has since seen a neurologist for evaluation.  Had an MRI this morning, and is scheduled to have an EEG tomorrow.\par Has been testing fingersticks at least once a day.  Pre-breakfast values have been mostly in the  range.  Post-prandials (tested 2 hours after different meals on different days, but infrequently after supper) have been < 140 mg%.\par Breakfast is usually either rice cakes or a sugar-free blueberry muffin.  (If she has a non-sugar-free muffin, it is usually only half, and her fingerstick checked afterward will still be in target range).\par Lunch and supper seem to be similar types of meals, usually a vegetable-based soup, with some type of protein (most often chicken) plus rice in the soup.  \par Intake of concentrated sweets is limited to 1/4 piece of cheesecake 1-2X/week.\par BPs checked at home are mostly < 130/80, but the diastolics will be in the low 80s approximately 1/4-1/3 of the time.\par Is up to date with ophth exams.\par Denies any numbness or paresthesias in her feet.\par Sister  last week while hospitalized in [place]--had been on dialysis for the past 6 years, but was not known to have CVD, and the pt is unsure about the cause of death.\par

## 2020-05-17 NOTE — ASSESSMENT
[FreeTextEntry1] : 1) Type 2 DM: Glycemic control is excellent by A1c level, and essentially all of her post-prandial fingersticks are in target range.   She does not appear to need a secretagogue added to her regimen.\par --Continue monotherapy with metformin\par --Continue FS monitoring daily, focusing on post-prandial values\par \par 2) Hypothyroidism:  Her current levothyroxine regimen (88 mcg X 6 1/2 tablets/week) averages 80 mcg/day.  She insists that she has been taking her medication consistently and correctly, so will increase the dose to 100 mcg/day.\par -New Rx sent for 100 mcg tablets\par \par 3) Hyperlipidemia:  LDL-cholesterol is below her target of 100 mg%.\par --Continue pravastatin 40 mg/day\par \par 4) Hypertension: BPs checked at home have been mostly < 130/80, though she has occasional diastolics in the 80-85 range.  Overall control is still quite acceptable, though will need to watch the pulse rates which are sometimes below 60/min.   \par --Continue losartan and HCTZ\par \par 5) Papillary thyroid CA:  Serum thyroglobulin level remains undetectable.  Her most recent ultrasound in August of 2019 was negative for recurrent disease in the thyroid bed or pathologic adenopathy.\par --Continue yearly ultrasounds and thyroglobulin levels\par \par Although she describes her dizziness during last week's episode somewhat poorly, it seems to have been vertigo.  She may have BPV, but will await Neuro work-up\par \par Pt to return for follow-up in 4 months.  CMP, lipids, A1c, TFTs before the visit.\par \par Duration of encounter 30 min.

## 2020-05-17 NOTE — REVIEW OF SYSTEMS
[Dry Eyes] : dryness [Eyes Itch] : itch [Stress] : stress [Fatigue] : no fatigue [Recent Weight Gain (___ Lbs)] : no recent weight gain [Decreased Appetite] : appetite not decreased [Recent Weight Loss (___ Lbs)] : no recent weight loss [Blurred Vision] : no blurred vision [Chills] : no chills [Fever] : no fever [Hearing Loss] : no hearing loss  [Dysphagia] : no dysphagia [Lower Ext Edema] : no lower extremity edema [Palpitations] : no palpitations [Chest Pain] : no chest pain [Cough] : no cough [Wheezing] : no wheezing [Shortness Of Breath] : no shortness of breath [SOB on Exertion] : no shortness of breath on exertion [Constipation] : no constipation [Nausea] : no nausea [Heartburn] : no heartburn [Dysuria] : no dysuria [Joint Pain] : no joint pain [Polyuria] : no polyuria [Myalgia] : no myalgia  [Muscle Cramps] : no muscle cramps [Muscle Weakness] : no muscle weakness [Dry Skin] : no dry skin [Hair Loss] : no hair loss [Headaches] : no headaches [Tremors] : no tremors [Difficulty Walking] : no difficulty walking [Depression] : no depression [Pain/Numbness of Digits] : no pain/numbness of digits [Insomnia] : no insomnia [Polydipsia] : no polydipsia [Cold Intolerance] : no cold intolerance [Heat Intolerance] : no heat intolerance [Easy Bleeding] : no ~M tendency for easy bleeding [Easy Bruising] : no tendency for easy bruising [FreeTextEntry4] : Tendency to nasal congestion whenever she goes outdoors [FreeTextEntry7] : Usually has 3-4 BM/day, mostly after eating [FreeTextEntry5] : Heart rates on her BP machine are not infrequently down to the 50-60 range [FreeTextEntry3] : Describes an occasional "gritty" sensation in her left eye which is not relieved by artificial tears [FreeTextEntry8] : Nocturia 1-2x/night [de-identified] : See HPI re: previous episode of dizziness (?? vertigo)--has not had a recurrence

## 2020-05-23 ENCOUNTER — EMERGENCY (EMERGENCY)
Facility: HOSPITAL | Age: 70
LOS: 0 days | Discharge: HOME | End: 2020-05-23
Attending: EMERGENCY MEDICINE | Admitting: EMERGENCY MEDICINE
Payer: MEDICARE

## 2020-05-23 VITALS
RESPIRATION RATE: 18 BRPM | SYSTOLIC BLOOD PRESSURE: 136 MMHG | TEMPERATURE: 99 F | HEART RATE: 71 BPM | OXYGEN SATURATION: 100 % | DIASTOLIC BLOOD PRESSURE: 78 MMHG

## 2020-05-23 DIAGNOSIS — R20.0 ANESTHESIA OF SKIN: ICD-10-CM

## 2020-05-23 DIAGNOSIS — E89.0 POSTPROCEDURAL HYPOTHYROIDISM: Chronic | ICD-10-CM

## 2020-05-23 DIAGNOSIS — Z79.899 OTHER LONG TERM (CURRENT) DRUG THERAPY: ICD-10-CM

## 2020-05-23 DIAGNOSIS — Z79.84 LONG TERM (CURRENT) USE OF ORAL HYPOGLYCEMIC DRUGS: ICD-10-CM

## 2020-05-23 DIAGNOSIS — E03.9 HYPOTHYROIDISM, UNSPECIFIED: ICD-10-CM

## 2020-05-23 DIAGNOSIS — E78.5 HYPERLIPIDEMIA, UNSPECIFIED: ICD-10-CM

## 2020-05-23 DIAGNOSIS — Z79.890 HORMONE REPLACEMENT THERAPY: ICD-10-CM

## 2020-05-23 DIAGNOSIS — Z79.82 LONG TERM (CURRENT) USE OF ASPIRIN: ICD-10-CM

## 2020-05-23 DIAGNOSIS — E11.9 TYPE 2 DIABETES MELLITUS WITHOUT COMPLICATIONS: ICD-10-CM

## 2020-05-23 DIAGNOSIS — Z88.0 ALLERGY STATUS TO PENICILLIN: ICD-10-CM

## 2020-05-23 DIAGNOSIS — Z90.09 ACQUIRED ABSENCE OF OTHER PART OF HEAD AND NECK: ICD-10-CM

## 2020-05-23 DIAGNOSIS — Z88.8 ALLERGY STATUS TO OTHER DRUGS, MEDICAMENTS AND BIOLOGICAL SUBSTANCES STATUS: ICD-10-CM

## 2020-05-23 DIAGNOSIS — I10 ESSENTIAL (PRIMARY) HYPERTENSION: ICD-10-CM

## 2020-05-23 DIAGNOSIS — Z85.850 PERSONAL HISTORY OF MALIGNANT NEOPLASM OF THYROID: ICD-10-CM

## 2020-05-23 PROCEDURE — 99284 EMERGENCY DEPT VISIT MOD MDM: CPT | Mod: GC

## 2020-05-23 PROCEDURE — 93010 ELECTROCARDIOGRAM REPORT: CPT

## 2020-05-23 NOTE — ED PROVIDER NOTE - PATIENT PORTAL LINK FT
You can access the FollowMyHealth Patient Portal offered by [facility] by registering at the following website: [email or web address]. By joining [employer]’s FollowMyHealth portal, you will also be able to view your health information using other applications (apps) compatible with our system.

## 2020-05-23 NOTE — ED PROVIDER NOTE - CARE PROVIDER_API CALL
Bj Morrissey  Cardiovascular Disease  [address]  [place] [address]  Phone: [phone]  Fax: [phone]  Follow Up Time: Routine

## 2020-05-23 NOTE — ED PROVIDER NOTE - ATTENDING CONTRIBUTION TO CARE
70 year old female, pmhx as documented above, presenting stating her blood pressure was high at home 178/108. States she had a "split second" of right facial and left arm tingling which she is having difficulty describing but no weakness in the face or arm. When asked to clarify time of symptom, patient states it was under 1 second. Per patient, she had the same exact symptoms 13 days ago, at which time she had a CT head, labs, full cardiac work up including nuclear stress test which was negative. Patient states she is otherwise at this time completely asymptomatic. Denies fevers, headache, vision changes, weakness, confusion, URI symptoms, neck pain, chest pain, back pain, dyspnea, cough, palpitations, nausea, vomiting, abdominal pain, diarrhea, constipation, blood in stool/dark stools, urinary symptoms, vaginal bleeding/discharge, leg swelling, rash, recent travel or sick contacts.    Vital Signs: I have reviewed the initial vital signs.  Constitutional: NAD, well-nourished, appears stated age, no acute distress.  HEENT: Airway patent, moist MM, no erythema/swelling/deformity of oral structures. EOMI, PERRLA.  CV: regular rate, regular rhythm, well-perfused extremities, 2+ b/l DP and radial pulses equal.  Lungs: BCTA, no increased WOB.  ABD: NTND, no guarding or rebound, no pulsatile mass, no hernias.   MSK: Neck supple, nontender, nl ROM, no stepoff. Chest nontender. Back nontender in TLS spine or to b/l bony structures or flanks. Ext nontender, nl rom, no deformity.   INTEG: Skin warm, dry, no rash.  NEURO: A&Ox3, normal strength, nl sensation throughout, normal speech.   PSYCH: Calm, cooperative, normal affect and interaction.    Fully neuro intact. Patient states this felt the exact same as when she had her prior episode 13 days ago, and is primarily worried that her blood pressure is elevated. BP in /78. EKG obtained from triage showed TWI in aVL which is the same as 5/10/20 and no other abnormalities. Patient ambulatory, tolerates PO. States she feels comfortable going home as long as her BP is normal and she wants to follow up as outpatient with her PMD.

## 2020-05-23 NOTE — ED ADULT NURSE NOTE - OBJECTIVE STATEMENT
pt presents to er with htn, and c/o some heavyness in her chest and numbness to the right side of her face

## 2020-05-23 NOTE — ED PROVIDER NOTE - CLINICAL SUMMARY MEDICAL DECISION MAKING FREE TEXT BOX
Patient presented with HTN from home. 1 episode of right facial "tingling" which lasted under 1 second. Patient with history of this occurring 13 days ago at which time she had extensive work up including CT head and full cardiac work up including nuclear stress which was negative. On exam in ED patient afebrile, HD stable, fully neurovascularly intact. /78, and patient completely asymptomatic. EKG obtained from triage which showed TWI in aVL but this is unchanged from prior EKG 5/10. No other abnormalities. Patient states she was primarily concerned about her blood pressure. When told her blood pressure was normal, patient states she feels comfortable going home and wants to follow up as outpatient with her PMD. Ambulatory without difficulty, tolerates PO. No significant concern for stroke/CVA/TIA since distribution of symptoms on bilateral sides of the body do not correlate, and additionally symptoms occurred for <1 second. Since patient with recent nuclear stress test and echo which were completely normal, no significant concern for cardiac etiology. Will discharge home with outpatient follow up. Patient agreeable with plan. Agrees to return to ED for any new or worsening symptoms.

## 2020-05-23 NOTE — ED PROVIDER NOTE - OBJECTIVE STATEMENT
69 yo F with PMHx of DM, HLD, glaucoma, HTN, thyroid cancer s/p thyroidectomy/radiation on synthroid w/ recent observation, negative nuclear stress test on 5/11, negative CT head on 5/10, echo on 5/11 with normal EF p/w high blood pressure states 178/108, had split second transient right sided facial tingling that went away spontaneously, no extremity numbness noted as per patient, worried about blood pressure and came in for medical evaluation. Patient denies any chest pain, states she is at her baseline, aware of nursing triage note and extensively discussed symptomatolgy with patient and she currently denies any complaints. Patients blood pressure 136/78. She takes Amlodipine 5 mg once a day, HCTZ 12.5 mg, Losartan 50 mg and has been compliant.

## 2020-05-23 NOTE — ED ADULT TRIAGE NOTE - CHIEF COMPLAINT QUOTE
Chest pain, R facial numbness and L arm numbness that began at 2pm today, numbness resolved. No neurological deficits, no weakness, no facial droop, speech clear.

## 2020-08-24 ENCOUNTER — APPOINTMENT (OUTPATIENT)
Dept: ENDOCRINOLOGY | Facility: CLINIC | Age: 70
End: 2020-08-24
Payer: MEDICARE

## 2020-08-24 VITALS
TEMPERATURE: 97.2 F | OXYGEN SATURATION: 98 % | BODY MASS INDEX: 22.53 KG/M2 | HEIGHT: 64 IN | RESPIRATION RATE: 16 BRPM | HEART RATE: 56 BPM | SYSTOLIC BLOOD PRESSURE: 137 MMHG | WEIGHT: 132 LBS | DIASTOLIC BLOOD PRESSURE: 77 MMHG

## 2020-08-24 PROCEDURE — 99214 OFFICE O/P EST MOD 30 MIN: CPT

## 2020-08-26 NOTE — HISTORY OF PRESENT ILLNESS
[FreeTextEntry1] : 70-year-old woman who is followed for type 2 DM, hyperlipidemia, hypertension, papillary thyroid CA and post-surgical hypothyroidism.  She is s/p partial left thyroid lobectomy in 1969, followed by a completion thyroidectomy in 2000.  The pathology after the latter surgery showed follicular variant of papillary CA, without any local extension or vascular invasion.  She received 75 mCi I-131 post-op to ablate a large left lobe remnant.  She has been free of any recurrent or metastatic disease since that time.  Her diabetes was diagnosed in December of 2016, having progressed after a 3-year history of pre-diabetes. \par \michael Was hospitalized at [facility] in May for dizziness and hypertensive urgency.  ("Dizziness" was actually vertigo).  EEG was negative, MRI showed moderate microvascular disease.  She is now taking HCTZ 12.5 mg/day, amlodiine 5 mg/day and losartan 50 mg/day.  Has been testing BPs at home quite frequently and these have been nearly all < 140/85.  Most of the readings are in fact < 130/80, though she sees occasional dips in the systolic to 90 mm Hg in the afternoon (in which case she will decrease the losartan to 25 mg that night).\michael Fingersticks tested 2 hrs after meals (once a day, after a different meal each day on a rotating basis.  Has had only 4 readings over 150 mg% in the past few months--after ice cream at night, or after coffee with sugar in the morning.\par Current diet:\michael --Often skips breakfast, otherwise has 2 eggs with 10 Ritz crackers.  (Will occasionally have a blueberry muffin, but cannot tell me whether her FS is elevated above target after this).\par --Lunch is usually tuna salad, not always with starch at the meal\par --Sometimes has fruit in the afternoon\par --Starch at supper is white rice (1/2 cup) most days.  Most of her post-prandial glucoses are below 150 unless she has cheesecake or ice cream after the meal.\michael Will be seeing her ophthalmologist next month.\michael Denies any numbness or paresthesias in her feet.\michael Has an ecchymosis on her left posterior thigh, but does not recall injuring that area.

## 2020-10-27 RX ORDER — LOSARTAN POTASSIUM 50 MG/1
50 TABLET, FILM COATED ORAL DAILY
Qty: 90 | Refills: 3 | Status: DISCONTINUED | COMMUNITY
Start: 2019-05-19 | End: 2020-10-27

## 2020-10-27 RX ORDER — HYDROCHLOROTHIAZIDE 12.5 MG/1
12.5 CAPSULE ORAL
Qty: 90 | Refills: 3 | Status: DISCONTINUED | COMMUNITY
Start: 2018-01-23 | End: 2020-10-27

## 2020-11-09 NOTE — CONSULT NOTE ADULT - ASSESSMENT
Needs note from provider saying it is okay to return to work after being tested +for covid (10/26 or 27 was date tested).  No symptoms now, symptoms ended last week, 11/1.      Provided referral information.   66 yo female patient with PMH of HTN, DLD, DM II, Thyroid CA, Hypothyroidism (s/p Rt partial thyroidectomy, followed by radioactive iodine ablation of the left side because of left lobe thyroid malignant nodule in 2000), presented because of gradual onset occipital headache a/w b/l upper extremity /facial  numbness paresthesias and nausea /vomiting starting 2/23/18. Symptoms had improved on arrival to ed and  currently denies symptoms.

## 2020-12-18 ENCOUNTER — APPOINTMENT (OUTPATIENT)
Dept: ENDOCRINOLOGY | Facility: CLINIC | Age: 70
End: 2020-12-18
Payer: MEDICARE

## 2020-12-18 VITALS
HEART RATE: 76 BPM | HEIGHT: 64 IN | WEIGHT: 130 LBS | TEMPERATURE: 97.6 F | BODY MASS INDEX: 22.2 KG/M2 | OXYGEN SATURATION: 98 % | DIASTOLIC BLOOD PRESSURE: 76 MMHG | SYSTOLIC BLOOD PRESSURE: 115 MMHG

## 2020-12-18 PROCEDURE — 99214 OFFICE O/P EST MOD 30 MIN: CPT

## 2020-12-18 RX ORDER — LEVOTHYROXINE SODIUM 0.09 MG/1
88 TABLET ORAL
Qty: 90 | Refills: 3 | Status: DISCONTINUED | COMMUNITY
Start: 2020-10-26 | End: 2020-12-18

## 2020-12-20 NOTE — REVIEW OF SYSTEMS
[Fatigue] : fatigue [Decreased Appetite] : decreased appetite [Shortness Of Breath] : shortness of breath [SOB on Exertion] : shortness of breath on exertion [Gas/Bloating] : gas/bloating [Headaches] : headaches [Insomnia] : insomnia [Stress] : stress [Fever] : no fever [Chills] : no chills [Dry Eyes] : no dryness [Blurred Vision] : no blurred vision [Eyes Itch] : no itch [Dysphagia] : no dysphagia [Hearing Loss] : no hearing loss  [Palpitations] : no palpitations [Lower Ext Edema] : no lower extremity edema [Cough] : no cough [Wheezing] : no wheezing [Constipation] : no constipation [Heartburn] : no heartburn [Diarrhea] : no diarrhea [Polyuria] : no polyuria [Dysuria] : no dysuria [Joint Pain] : no joint pain [Muscle Weakness] : no muscle weakness [Myalgia] : no myalgia  [Joint Stiffness] : no joint stiffness [Dry Skin] : no dry skin [Hair Loss] : no hair loss [Ulcer] : no ulcer [Tremors] : no tremors [Pain/Numbness of Digits] : no pain/numbness of digits [Depression] : no depression [Polydipsia] : no polydipsia [Easy Bleeding] : no ~M tendency for easy bleeding [Easy Bruising] : no tendency for easy bruising [FreeTextEntry2] : Has lost 2 lb since her last visit [FreeTextEntry3] : Uses reading glasses [FreeTextEntry5] : Has frequent non-exertional chest pain [FreeTextEntry7] : Intermittent nausea and reflux symptoms.   [FreeTextEntry8] : Nocturia 5X/night

## 2020-12-20 NOTE — PHYSICAL EXAM
[Alert] : alert [Well Nourished] : well nourished [Healthy Appearance] : healthy appearance [EOMI] : extra ocular movement intact [No Proptosis] : no proptosis [No Lid Lag] : no lid lag [Normal Outer Ear/Nose] : the ears and nose were normal in appearance [Normal Hearing] : hearing was normal [No Neck Mass] : no neck mass was observed [No LAD] : no lymphadenopathy [Clear to Auscultation] : lungs were clear to auscultation bilaterally [No Murmurs] : no murmurs [Normal Rate] : heart rate was normal [Regular Rhythm] : with a regular rhythm [Carotids Normal] : carotid pulses were normal with no bruits [No Edema] : no peripheral edema [Not Tender] : non-tender [Soft] : abdomen soft [No HSM] : no hepato-splenomegaly [Normal Supraclavicular Nodes] : no supraclavicular lymphadenopathy [Normal Anterior Cervical Nodes] : no anterior cervical lymphadenopathy [No CVA Tenderness] : no ~M costovertebral angle tenderness [Normal Gait] : normal gait [No Involuntary Movements] : no involuntary movements were seen [No Joint Swelling] : no joint swelling seen [Normal Strength/Tone] : muscle strength and tone were normal [No Tremors] : no tremors [Normal Sensation on Monofilament Testing] : normal sensation on monofilament testing of lower extremities [Normal Affect] : the affect was normal [Normal Mood] : the mood was normal [Kyphosis] : no kyphosis present [Acanthosis Nigricans] : no acanthosis nigricans [Foot Ulcers] : no foot ulcers [Hirsutism] : no hirsutism [de-identified] : Pupils miotic.  Dense corneal arcus [de-identified] : Thyroidectomy scar.  No palpable thyroid tissue [de-identified] : DP pulses 2+ bilaterally [de-identified] : Vibratory sensation moderately decreased over the toes

## 2020-12-20 NOTE — HISTORY OF PRESENT ILLNESS
[FreeTextEntry1] : 70-year-old woman who is followed for type 2 DM, hyperlipidemia, hypertension, papillary thyroid CA and post-surgical hypothyroidism.  She is s/p partial left thyroid lobectomy in 1969, followed by a completion thyroidectomy in 2000.  The pathology after the latter surgery showed follicular variant of papillary CA, without any local extension or vascular invasion.  She received 75 mCi I-131 post-op to ablate a large left lobe remnant.  She has been free of any recurrent or metastatic disease since that time.  Her diabetes was diagnosed in December of 2016, having progressed after a 3-year history of pre-diabetes. \par \par Still having intermittent elevations of her BP, ?? associated with symptoms.  \michael Has been checking her BPs at home twice a day, however, and nearly all of these are < 130/80.\par Having attacks of vertigo, but these do not necessarily correlate with changes in her BP.  (Had previously seen a neurologist, and brain MRI was negative).\par Medications reviewed--Is taking only 1/2 tablet of the losartan/HCTZ, but is now on amlodipine 5 mg/day.  Is still on pravastatin 40 mg/day.\par Still complaining of fatigue and a poor appetite.  Has lost another 2 lb in weight.\par Pre-breakfast fingersticks have been in the  range, and post-breakfast values "120."  (Breakfast is one or two hard-boiled eggs, sometimes with toast.  Has stopped putting sugar in her coffee).\par Lunch is most often a chicken stew, and is the main meal of the day.  Her post-prandial readings are below 130.\michael Does not have a third meal--snacks on popcorn at night\michael Denies any numbness or paresthesias in her feet.\par \par \par \par

## 2020-12-20 NOTE — ASSESSMENT
[FreeTextEntry1] : 1) Type 2 DM: Glycemic control is excellent as assessed by A1c level and fingerstick monitoring\par --Continue monotherapy with metformin.\par --Continue fingerstick monitoring, focus on post-prandial testing\par \par 2) Hypothyroidism:  TSH level is in the target range of 0.4-1.0 uU/ml--i.e. low-normal but non-suppressed\par --Continue levothyroxine 100 mcg/day\par \par 3) Hyperlipidemia:  LDL-cholesterol remains above her target of 70 mg%.  An increase in the pravastatin to 80 mg/day is unlikely to get her to goal, so will make another attempt with a stronger statin.\par --Change to rosuvastatin, start at 10 mg/day\par \par 4) Hypertension:  BP is excellent on today's exam, and her readings at home have been in target range.\par --Continue amlodipine plus a half-tablet of losartan/HCTZ 50/12.5\par \par 5) Papillary thyroid carcinoma:  Her last ultrasound in August of 2019 was negative for recurrent disease or pathologic adenopathy\par --Defer next ultrasound until mid-2021\par \par See for follow-up in 4 months.  CMP, lipids, A1c, TFTs, thyroglobulin, and CBC  before the visit

## 2021-04-05 ENCOUNTER — APPOINTMENT (OUTPATIENT)
Dept: ENDOCRINOLOGY | Facility: CLINIC | Age: 71
End: 2021-04-05
Payer: MEDICARE

## 2021-04-05 VITALS
HEIGHT: 64 IN | TEMPERATURE: 97.3 F | HEART RATE: 59 BPM | OXYGEN SATURATION: 98 % | BODY MASS INDEX: 23.39 KG/M2 | DIASTOLIC BLOOD PRESSURE: 75 MMHG | SYSTOLIC BLOOD PRESSURE: 148 MMHG | WEIGHT: 137 LBS

## 2021-04-05 PROCEDURE — 99214 OFFICE O/P EST MOD 30 MIN: CPT

## 2021-04-05 RX ORDER — BLOOD SUGAR DIAGNOSTIC
STRIP MISCELLANEOUS
Qty: 100 | Refills: 3 | Status: DISCONTINUED | COMMUNITY
Start: 2019-01-16 | End: 2021-04-05

## 2021-04-05 RX ORDER — PRAVASTATIN SODIUM 40 MG/1
40 TABLET ORAL
Qty: 90 | Refills: 3 | Status: DISCONTINUED | COMMUNITY
Start: 2018-01-23 | End: 2021-04-05

## 2021-04-05 RX ORDER — ROSUVASTATIN CALCIUM 10 MG/1
10 TABLET, FILM COATED ORAL
Qty: 90 | Refills: 3 | Status: DISCONTINUED | COMMUNITY
Start: 2020-12-18 | End: 2021-04-05

## 2021-04-07 NOTE — HISTORY OF PRESENT ILLNESS
[FreeTextEntry1] : 70-year-old woman who is followed for type 2 DM, hyperlipidemia, hypertension, papillary thyroid CA and post-surgical hypothyroidism.  She is s/p partial left thyroid lobectomy in , followed by a completion thyroidectomy in .  The pathology after the latter surgery showed follicular variant of papillary CA, without any local extension or vascular invasion.  She received 75 mCi I-131 post-op to ablate a large left lobe remnant.  She has been free of any recurrent or metastatic disease since that time.  Her diabetes was diagnosed in 2016, having progressed after a 3-year history of pre-diabetes. \par \par Says "my heart is not good"--has been having more PERLA, also more lability of her BP.  Still getting high readings when she sees her physicians, but normal values at home in the morning.  She occasionally has borderline hypotension in the afternoon, with systolics in the  range.\michael Had an echocardiogram done at [facility]--EF 57%, grade 1 diastolic dysfunction, but also hypokinesis of the basal posterior wall suggestive of CAD.\par Her son (age 54)  suddenly in mid-March.  Lived in [place]--Medical Examiner has not finished autopsy.\par Fingersticks and diet reviewed:\par --Pre-breakfast values are in the  range\par --Readings checked 2 hrs after breakfast (which is either oatmeal or half a sugar-free blueberry muffin) are "120-130."\par --Lunch is usually just fish and vegetables.  Post-lunch fingersticks are below 120-130 unless she has a piece of cheesecake after the meal, in which case the FS will be up to 150\par --Says that she does not eat a third meal--has only fruit at night.\michael Has gained 7 lb since her last visit--blames lack of exercise, which she stopped because of fear of heart disease, also because of lack of motivation.\par \par \par \par \par

## 2021-04-07 NOTE — PHYSICAL EXAM
[Alert] : alert [Well Nourished] : well nourished [Healthy Appearance] : healthy appearance [Normal Sclera/Conjunctiva] : normal sclera/conjunctiva [EOMI] : extra ocular movement intact [No Proptosis] : no proptosis [No Lid Lag] : no lid lag [Normal Outer Ear/Nose] : the ears and nose were normal in appearance [Normal Hearing] : hearing was normal [No Neck Mass] : no neck mass was observed [No LAD] : no lymphadenopathy [Clear to Auscultation] : lungs were clear to auscultation bilaterally [Normal Rate] : heart rate was normal [Regular Rhythm] : with a regular rhythm [Carotids Normal] : carotid pulses were normal with no bruits [No Edema] : no peripheral edema [Not Tender] : non-tender [Soft] : abdomen soft [No HSM] : no hepato-splenomegaly [Normal Supraclavicular Nodes] : no supraclavicular lymphadenopathy [Normal Anterior Cervical Nodes] : no anterior cervical lymphadenopathy [No CVA Tenderness] : no ~M costovertebral angle tenderness [Normal Gait] : normal gait [No Involuntary Movements] : no involuntary movements were seen [No Joint Swelling] : no joint swelling seen [Normal Strength/Tone] : muscle strength and tone were normal [No Rash] : no rash [No Tremors] : no tremors [Normal Sensation on Monofilament Testing] : normal sensation on monofilament testing of lower extremities [Normal Affect] : the affect was normal [Kyphosis] : no kyphosis present [Acanthosis Nigricans] : no acanthosis nigricans [Foot Ulcers] : no foot ulcers [de-identified] : Pupils miotic.  Dense corneal arcus [de-identified] : Thyroidectomy scar.  No palpable thyroid tissue [de-identified] : Short mid-systolic murmur localized to the LSB [de-identified] : DP pulses 3+ bilaterally [de-identified] : Vibratory sensation appears to be absent at the toes and malleoli [de-identified] : Very anxious

## 2021-04-07 NOTE — REVIEW OF SYSTEMS
[Fatigue] : fatigue [Decreased Appetite] : decreased appetite [Chest Pain] : chest pain [SOB on Exertion] : shortness of breath on exertion [Anxiety] : anxiety [Stress] : stress [Polydipsia] : polydipsia [Fever] : no fever [Chills] : no chills [Dry Eyes] : no dryness [Blurred Vision] : no blurred vision [Eyes Itch] : no itch [Dysphagia] : no dysphagia [Hearing Loss] : no hearing loss  [Palpitations] : no palpitations [Lower Ext Edema] : no lower extremity edema [Shortness Of Breath] : no shortness of breath [Cough] : no cough [Wheezing] : no wheezing [Nausea] : no nausea [Constipation] : no constipation [Heartburn] : no heartburn [Polyuria] : no polyuria [Dysuria] : no dysuria [Joint Pain] : no joint pain [Muscle Weakness] : no muscle weakness [Myalgia] : no myalgia  [Joint Stiffness] : no joint stiffness [Dry Skin] : no dry skin [Hair Loss] : no hair loss [Ulcer] : no ulcer [Headaches] : no headaches [Difficulty Walking] : no difficulty walking [Tremors] : no tremors [Pain/Numbness of Digits] : no pain/numbness of digits [Depression] : no depression [Cold Intolerance] : no cold intolerance [Heat Intolerance] : no heat intolerance [Easy Bleeding] : no ~M tendency for easy bleeding [Easy Bruising] : no tendency for easy bruising [FreeTextEntry2] : Has gained 7 lb since her last visit [FreeTextEntry7] : Has 3-4 BM/day but says that they are not diarrheal [FreeTextEntry8] : Nocturia 3X/night

## 2021-04-07 NOTE — DATA REVIEWED
[FreeTextEntry1] : [facility]  (3/26/21)\par \par FBS 98,  A1c 6.3%\par CMP and CBC WNL\par , , TG 70\par Urine microalbumin ratio negative at 14  (normal < 30)\par TSH 1.24 uU/ml  (0.45-4.5)\par Serum thyroglobulin < 0.1 ng/ml  (Antibodies neg)\par

## 2021-04-20 ENCOUNTER — APPOINTMENT (OUTPATIENT)
Dept: HEART AND VASCULAR | Facility: CLINIC | Age: 71
End: 2021-04-20

## 2021-05-24 ENCOUNTER — OUTPATIENT (OUTPATIENT)
Dept: OUTPATIENT SERVICES | Facility: HOSPITAL | Age: 71
LOS: 1 days | Discharge: HOME | End: 2021-05-24
Payer: MEDICARE

## 2021-05-24 DIAGNOSIS — E89.0 POSTPROCEDURAL HYPOTHYROIDISM: Chronic | ICD-10-CM

## 2021-05-24 DIAGNOSIS — R06.02 SHORTNESS OF BREATH: ICD-10-CM

## 2021-05-24 PROCEDURE — 78452 HT MUSCLE IMAGE SPECT MULT: CPT | Mod: 26,MH

## 2021-07-19 ENCOUNTER — APPOINTMENT (OUTPATIENT)
Dept: ENDOCRINOLOGY | Facility: CLINIC | Age: 71
End: 2021-07-19
Payer: MEDICARE

## 2021-07-19 VITALS
DIASTOLIC BLOOD PRESSURE: 82 MMHG | HEART RATE: 47 BPM | SYSTOLIC BLOOD PRESSURE: 150 MMHG | HEIGHT: 64 IN | WEIGHT: 131 LBS | OXYGEN SATURATION: 99 % | TEMPERATURE: 98 F | BODY MASS INDEX: 22.36 KG/M2

## 2021-07-19 PROCEDURE — 99214 OFFICE O/P EST MOD 30 MIN: CPT

## 2021-07-25 NOTE — REVIEW OF SYSTEMS
[SOB on Exertion] : shortness of breath on exertion [Fatigue] : no fatigue [Decreased Appetite] : appetite not decreased [Fever] : no fever [Chills] : no chills [Dry Eyes] : no dryness [Blurred Vision] : no blurred vision [Eyes Itch] : no itch [Dysphagia] : no dysphagia [Hearing Loss] : no hearing loss  [Palpitations] : no palpitations [Lower Ext Edema] : no lower extremity edema [Shortness Of Breath] : no shortness of breath [Cough] : no cough [Wheezing] : no wheezing [Nausea] : no nausea [Constipation] : no constipation [Heartburn] : no heartburn [Polyuria] : no polyuria [Dysuria] : no dysuria [Joint Pain] : no joint pain [Muscle Weakness] : no muscle weakness [Myalgia] : no myalgia  [Joint Stiffness] : no joint stiffness [Dry Skin] : no dry skin [Hair Loss] : no hair loss [Ulcer] : no ulcer [Headaches] : no headaches [Difficulty Walking] : no difficulty walking [Tremors] : no tremors [Pain/Numbness of Digits] : no pain/numbness of digits [Depression] : no depression [Anxiety] : no anxiety [Stress] : no stress [Polydipsia] : no polydipsia [Cold Intolerance] : no cold intolerance [Heat Intolerance] : no heat intolerance [Easy Bleeding] : no ~M tendency for easy bleeding [Easy Bruising] : no tendency for easy bruising [FreeTextEntry2] : Has lost 6 lb since her last visit [FreeTextEntry5] : Still with intermittent non-exertional chest pain [FreeTextEntry7] : Bowel pattern is still 3-4 movements a day [FreeTextEntry8] : Nocturia 3X/night

## 2021-07-25 NOTE — DATA REVIEWED
[FreeTextEntry1] : [facility]  (7/10/21)\par \par FBS 95,  A1c 6.2%\par CMP WNL\par LDL 75, , TG 95\par TSH 0.925 uU/ml  (0.45-4.5)

## 2021-07-25 NOTE — HISTORY OF PRESENT ILLNESS
[FreeTextEntry1] : 71-year-old woman who is followed for type 2 DM, hyperlipidemia, hypertension, papillary thyroid CA and post-surgical hypothyroidism.  She is s/p partial left thyroid lobectomy in 1969, followed by a completion thyroidectomy in 2000.  The pathology after the latter surgery showed follicular variant of papillary CA, without any local extension or vascular invasion.  She received 75 mCi I-131 post-op to ablate a large left lobe remnant.  She has been free of any recurrent or metastatic disease since that time.  Her diabetes was diagnosed in December of 2016, having progressed after a 3-year history of pre-diabetes. \par \michael Had an echocardiogram which suggested hypokinesis of the basal/posterior wall, and therefore had a subsequent nuclear treadmill stress test on 5/24/21.  This was negative for ischemia, and showed an EF of 70%.  \par Has been testing BPs at home almost 3X/day.  Systolics are usually 130-140, occasionally down to 115-120 (mostly later in the day).\par Diastolics are sub-optimal, however, with many in the low 80 range.  Is taking HCTZ 12.5 mg qAM, losartan 50 mg at night, and amlodipine in the early afternoon.\par Testing fingersticks twice a day:\par --Pre-breakfast readings have been in the  range.\par --Post-breakfast fingersticks are usually near 120 if she has oatmeal, but up to 150 if she has leftovers from the night before (which sometimes include sticky rice)\par --Lunch is usually fish and steamed vegetables\par --Supper seems to be corned beef, two slices of bread and potato.  (Says that the potato is to "cut the salt in the corned beef")\par --Does not snack after supper\michael Has had an ophth exam within the past year which was negative.\michael Denies any numbness or paresthesias in her feet.

## 2021-07-25 NOTE — PHYSICAL EXAM
[Alert] : alert [Well Nourished] : well nourished [Healthy Appearance] : healthy appearance [Normal Sclera/Conjunctiva] : normal sclera/conjunctiva [EOMI] : extra ocular movement intact [No Proptosis] : no proptosis [No Lid Lag] : no lid lag [Normal Outer Ear/Nose] : the ears and nose were normal in appearance [Normal Hearing] : hearing was normal [No Neck Mass] : no neck mass was observed [No LAD] : no lymphadenopathy [Clear to Auscultation] : lungs were clear to auscultation bilaterally [No Murmurs] : no murmurs [Normal Rate] : heart rate was normal [Regular Rhythm] : with a regular rhythm [Carotids Normal] : carotid pulses were normal with no bruits [No Edema] : no peripheral edema [Not Tender] : non-tender [Soft] : abdomen soft [No HSM] : no hepato-splenomegaly [Normal Supraclavicular Nodes] : no supraclavicular lymphadenopathy [Normal Anterior Cervical Nodes] : no anterior cervical lymphadenopathy [No CVA Tenderness] : no ~M costovertebral angle tenderness [Normal Gait] : normal gait [No Involuntary Movements] : no involuntary movements were seen [No Joint Swelling] : no joint swelling seen [Normal Strength/Tone] : muscle strength and tone were normal [No Rash] : no rash [Right Foot Was Examined] : right foot ~C was examined [Left Foot Was Examined] : left foot ~C was examined [Normal] : normal [0] : 0 in the posterior tibialis [2+] : 2+ in the dorsalis pedis [Vibration Dec.] : diminished vibratory sensation at the level of the toes [No Tremors] : no tremors [Normal Sensation on Monofilament Testing] : normal sensation on monofilament testing of lower extremities [Normal Affect] : the affect was normal [Normal Mood] : the mood was normal [Kyphosis] : no kyphosis present [Acanthosis Nigricans] : no acanthosis nigricans [Foot Ulcers] : no foot ulcers [Delayed in the Right Toes] : normal in the toes [Delayed in the Left Toes] : normal in the toes [Position Sense Dec.] : normal position sense at the level of the toes [#1 Diminished] : number 1 was normal [#2 Diminished] : number 2 was normal [#3 Diminished] : number 3 was normal [#4 Diminished] : number 4 was normal [#5 Diminished] : number 5 was normal [#6 Diminished] : number 6 was normal [#8 Diminished] : number 8 was normal [#7 Diminished] : number 7 was normal [#9 Diminished] : number 9 was normal [#10 Diminished] : number 10 was normal [de-identified] : Pupils miotic.  Dense corneal arcus [de-identified] : Thyroidectomy scar.  No palpable thyroid tissue [de-identified] : DP pulses 2+ bilaterally [de-identified] : Vibratory sensation moderately decreased over the toes

## 2021-07-25 NOTE — ASSESSMENT
[FreeTextEntry1] : 1) Type 2 DM:  Glycemic control is excellent by A1c level, though she needs to increase her fingerstick monitoring after lunch and supper.\par --Continue metformin.\par --Increase post-prandial fingerstick monitoring (and limit AM testing to 1-2X/week)\par --Diet reinforced.  Needs to limit the carbs at supper to bread or potato--not both\par \par 2) Hypothyroidism:  TSH level is in the target range of 0.4-1.0 uU/ml.  (As noted at previous visits, will not aim for a suppressed TSH level despite her history of thyroid CA given her age and long disease-free interval).\par --Continue levothyroxine 100 mcg/day\par \par 3) Hyperlipidemia:  LDL-cholesterol is well below her target of 100 mg%, and only slightly above the stricter target of 70 mg%.\par --Continue rosuvastatin 20 mg/day\par \par 4) Hypertension: BP is mildly elevated at today's visit, and her readings at home have shown many diastolics which are slightly above the optimal range.\par --To continue losartan/HCTZ and amlodipine.  Will consider increasing the amlodipine if her diastolics remain near or above 85 mm Hg. \par \par 5) Papillary thyroid CA:  It has now been 20 years since her completion thyroidectomy and I-131 therapy.  She remains free of recurrent or metastatic disease..\par --Continue yearly thyroglobulin levels\par --Ultrasounds approximately every 2 years\par \par See for follow-up in 3-4 months.  CMP, lipids, A1c, TFTs, thyroglob level, CBC and microalb before the visit [FreeTextEntry2] : Diet, post-lunch and supper FS monitoring, BP targets

## 2021-10-18 ENCOUNTER — APPOINTMENT (OUTPATIENT)
Dept: ENDOCRINOLOGY | Facility: CLINIC | Age: 71
End: 2021-10-18
Payer: MEDICARE

## 2021-10-18 VITALS
HEIGHT: 64 IN | WEIGHT: 129 LBS | OXYGEN SATURATION: 99 % | SYSTOLIC BLOOD PRESSURE: 133 MMHG | DIASTOLIC BLOOD PRESSURE: 74 MMHG | HEART RATE: 75 BPM | BODY MASS INDEX: 22.02 KG/M2 | TEMPERATURE: 97.2 F

## 2021-10-18 PROCEDURE — 99214 OFFICE O/P EST MOD 30 MIN: CPT

## 2021-10-18 RX ORDER — MOMETASONE FUROATE 1 MG/G
0.1 OINTMENT TOPICAL
Qty: 45 | Refills: 0 | Status: ACTIVE | COMMUNITY
Start: 2021-10-14

## 2021-10-18 RX ORDER — TEMAZEPAM 15 MG/1
15 CAPSULE ORAL
Qty: 30 | Refills: 0 | Status: ACTIVE | COMMUNITY
Start: 2021-10-14

## 2021-10-18 RX ORDER — BLOOD-GLUCOSE METER
W/DEVICE EACH MISCELLANEOUS
Qty: 1 | Refills: 0 | Status: ACTIVE | COMMUNITY
Start: 2021-10-14

## 2021-10-18 RX ORDER — LOSARTAN POTASSIUM AND HYDROCHLOROTHIAZIDE 12.5; 5 MG/1; MG/1
50-12.5 TABLET ORAL DAILY
Qty: 90 | Refills: 3 | Status: DISCONTINUED | COMMUNITY
Start: 2020-10-27 | End: 2021-10-18

## 2021-10-18 RX ORDER — BLOOD SUGAR DIAGNOSTIC
STRIP MISCELLANEOUS
Qty: 200 | Refills: 3 | Status: DISCONTINUED | COMMUNITY
Start: 2021-04-05 | End: 2021-10-18

## 2021-10-18 RX ORDER — OMEPRAZOLE 20 MG/1
20 CAPSULE, DELAYED RELEASE ORAL
Qty: 180 | Refills: 0 | Status: ACTIVE | COMMUNITY
Start: 2021-10-14

## 2021-10-18 RX ORDER — LANCETS 33 GAUGE
EACH MISCELLANEOUS
Qty: 200 | Refills: 3 | Status: DISCONTINUED | COMMUNITY
Start: 2019-01-16 | End: 2021-10-18

## 2021-10-19 RX ORDER — HYDROCHLOROTHIAZIDE 12.5 MG/1
12.5 TABLET ORAL
Qty: 90 | Refills: 0 | Status: DISCONTINUED | COMMUNITY
Start: 2021-10-14 | End: 2021-10-19

## 2021-10-22 ENCOUNTER — APPOINTMENT (OUTPATIENT)
Dept: ULTRASOUND IMAGING | Facility: CLINIC | Age: 71
End: 2021-10-22
Payer: MEDICARE

## 2021-10-22 ENCOUNTER — RESULT REVIEW (OUTPATIENT)
Age: 71
End: 2021-10-22

## 2021-10-22 ENCOUNTER — OUTPATIENT (OUTPATIENT)
Dept: OUTPATIENT SERVICES | Facility: HOSPITAL | Age: 71
LOS: 1 days | End: 2021-10-22

## 2021-10-22 DIAGNOSIS — E89.0 POSTPROCEDURAL HYPOTHYROIDISM: Chronic | ICD-10-CM

## 2021-10-22 PROCEDURE — 76536 US EXAM OF HEAD AND NECK: CPT | Mod: 26

## 2021-10-23 NOTE — REASON FOR VISIT
[Other___] : [unfilled] [Follow - Up] : a follow-up visit [DM Type 2] : DM Type 2 [Hypothyroidism] : hypothyroidism [Thyroid Cancer] : thyroid cancer

## 2021-10-26 ENCOUNTER — NON-APPOINTMENT (OUTPATIENT)
Age: 71
End: 2021-10-26

## 2021-10-26 NOTE — REVIEW OF SYSTEMS
[SOB on Exertion] : shortness of breath on exertion [Anxiety] : anxiety [Fatigue] : no fatigue [Decreased Appetite] : appetite not decreased [Fever] : no fever [Chills] : no chills [Dry Eyes] : no dryness [Eyes Itch] : no itch [Dysphagia] : no dysphagia [Hearing Loss] : no hearing loss  [Palpitations] : no palpitations [Lower Ext Edema] : no lower extremity edema [Shortness Of Breath] : no shortness of breath [Cough] : no cough [Wheezing] : no wheezing [Nausea] : no nausea [Constipation] : no constipation [Heartburn] : no heartburn [Diarrhea] : no diarrhea [Polyuria] : no polyuria [Dysuria] : no dysuria [Muscle Weakness] : no muscle weakness [Myalgia] : no myalgia  [Dry Skin] : no dry skin [Hair Loss] : no hair loss [Ulcer] : no ulcer [Headaches] : no headaches [Difficulty Walking] : no difficulty walking [Tremors] : no tremors [Pain/Numbness of Digits] : no pain/numbness of digits [Depression] : no depression [Stress] : no stress [Polydipsia] : no polydipsia [Cold Intolerance] : no cold intolerance [Heat Intolerance] : no heat intolerance [Easy Bleeding] : no ~M tendency for easy bleeding [Easy Bruising] : no tendency for easy bruising [FreeTextEntry2] : Has lost 2 lb since her last visit [FreeTextEntry3] : Blurred vision in her left eye for the past several days [FreeTextEntry5] : See HPI re: recent hospitalizations for chest pain [FreeTextEntry8] : Nocturia 3X/night [FreeTextEntry9] : Arthralgias in her hands (R > L) when she first wakes up in the morning

## 2021-10-26 NOTE — ASSESSMENT
[FreeTextEntry1] : 1) Type 2 DM:  Glycemic control is excellent, as assessed by both A1c level and fingerstick monitoring.\par --Continue monotherapy with metformin\par --Continue to focus fingerstick monitoring on post-prandial values\par \par 2) Hypothyroidism:  TSH level is once again below normal at 0.25 uU/ml.  Given her age and long disease-free interval (21 years since her surgery and I-131 therapy), her TSH level is no longer being targeted to the suppressed range.\par --To decrease the levothyroxine to 100 mcg 6 days/week, 50 mcg (1/2 tablet) 1 day/week\par \par 3) Thyroid CA: Thyroglobulin level remains undetectable.  She is overdue for a thyroid ultrasound, and will obtain this before her next visit. It has now been over 20 years since her completion thyroidectomy and radioiodine therapy.\par --Thyroid ultrasound at [facility] prior to her next visit\par \par 4) Hyperlipidemia:  LDL-cholesterol is above her target of 100 mg% despite therapy with rosuvastatin.  Strongly suspect that this is due to missed doses of medication, which the pt seems to hint at.\par --Continue rosuvastatin 20 mg/day for now \par \par 5) Hypertension: BP is excellent at today's visit, and her readings at home have been nearly all in the optimal range of < 130/80.\par --Continue combination therapy with losartan, HCTZ (now prescribed separately) and amlodipine.\par \par 6) Panic disorder:  Pt's episodes of acute dyspnea with associated chest tightness which led to her hospitalizations would fit the clinical picture of panic attacks, especially given the negative cardiac workup which followed.  She also has significant underlying chronic anxiety.  \par --Discussed this with her in detail, and suggested that she consider a trial of an SSRI to see if this helped with her symptoms.  She indicated that she was willing to try, and will start with a small dose of Lexapro.  Will send a prescription for 5 mg escitalopram, but told her to start at 2.5 mg/day for the first week.  Warned re: GI upset, etc.\par \par See for follow-up in 4 months.  CMP, lipids, A1c, TFTs, microalbumin before the visit [FreeTextEntry2] : Diet, potential benefits/side-effects of SSRIs

## 2021-10-26 NOTE — PHYSICAL EXAM
[Alert] : alert [Well Nourished] : well nourished [Healthy Appearance] : healthy appearance [Normal Sclera/Conjunctiva] : normal sclera/conjunctiva [EOMI] : extra ocular movement intact [No Proptosis] : no proptosis [No Lid Lag] : no lid lag [Normal Outer Ear/Nose] : the ears and nose were normal in appearance [Normal Hearing] : hearing was normal [No Neck Mass] : no neck mass was observed [No LAD] : no lymphadenopathy [Clear to Auscultation] : lungs were clear to auscultation bilaterally [No Murmurs] : no murmurs [Normal Rate] : heart rate was normal [Regular Rhythm] : with a regular rhythm [Carotids Normal] : carotid pulses were normal with no bruits [No Edema] : no peripheral edema [Not Tender] : non-tender [Soft] : abdomen soft [No HSM] : no hepato-splenomegaly [Normal Supraclavicular Nodes] : no supraclavicular lymphadenopathy [Normal Anterior Cervical Nodes] : no anterior cervical lymphadenopathy [No CVA Tenderness] : no ~M costovertebral angle tenderness [Normal Gait] : normal gait [No Involuntary Movements] : no involuntary movements were seen [No Joint Swelling] : no joint swelling seen [Normal Strength/Tone] : muscle strength and tone were normal [No Rash] : no rash [1+] : 1+ in the dorsalis pedis [Normal] : normal [0] : 0 in the posterior tibialis [2+] : 2+ in the dorsalis pedis [Vibration Dec.] : diminished vibratory sensation at the level of the toes [No Tremors] : no tremors [Normal Sensation on Monofilament Testing] : normal sensation on monofilament testing of lower extremities [Normal Affect] : the affect was normal [Normal Mood] : the mood was normal [Right foot was examined, including] : right foot ~C was examined, including visual inspection with sensory and pulse exams [Left foot was examined, including] : left foot ~C was examined, including visual inspection with sensory and pulse exams [Kyphosis] : no kyphosis present [Acanthosis Nigricans] : no acanthosis nigricans [Foot Ulcers] : no foot ulcers [Delayed in the Right Toes] : normal in the toes [Delayed in the Left Toes] : normal in the toes [Position Sense Dec.] : normal position sense at the level of the toes [Diminished Throughout Both Feet] : normal tactile sensation with monofilament testing throughout both feet [#1 Diminished] : number 1 was normal [#2 Diminished] : number 2 was normal [#3 Diminished] : number 3 was normal [#5 Diminished] : number 5 was normal [#4 Diminished] : number 4 was normal [#6 Diminished] : number 6 was normal [#7 Diminished] : number 7 was normal [#8 Diminished] : number 8 was normal [#9 Diminished] : number 9 was normal [#10 Diminished] : number 10 was normal [de-identified] : Pupils miotic.  Dense corneal arcus [de-identified] : Thyroidectomy scar.  No palpable thyroid tissue [de-identified] : DP pulses 2+ on the left, 1+ on the right [de-identified] : Vibratory sensation significantly decreased over the toes

## 2021-10-26 NOTE — ASSESSMENT
[FreeTextEntry1] : 1) Type 2 DM:  Glycemic control is excellent, as assessed by both A1c level and fingerstick monitoring.\par --Continue monotherapy with metformin\par --Continue to focus fingerstick monitoring on post-prandial values\par \par 2) Hypothyroidism:  TSH level is once again below normal at 0.25 uU/ml.  Given her age and long disease-free interval (21 years since her surgery and I-131 therapy), her TSH level is no longer being targeted to the suppressed range.\par --To decrease the levothyroxine to 100 mcg 6 days/week, 50 mcg (1/2 tablet) 1 day/week\par \par 3) Thyroid CA: Thyroglobulin level remains undetectable.  She is overdue for a thyroid ultrasound, and will obtain this before her next visit. It has now been over 20 years since her completion thyroidectomy and radioiodine therapy.\par --Thyroid ultrasound at [facility] prior to her next visit\par \par 4) Hyperlipidemia:  LDL-cholesterol is above her target of 100 mg% despite therapy with rosuvastatin.  Strongly suspect that this is due to missed doses of medication, which the pt seems to hint at.\par --Continue rosuvastatin 20 mg/day for now \par \par 5) Hypertension: BP is excellent at today's visit, and her readings at home have been nearly all in the optimal range of < 130/80.\par --Continue combination therapy with losartan, HCTZ (now prescribed separately) and amlodipine.\par \par 6) Panic disorder:  Pt's episodes of acute dyspnea with associated chest tightness which led to her hospitalizations would fit the clinical picture of panic attacks, especially given the negative cardiac workup which followed.  She also has significant underlying chronic anxiety.  \par --Discussed this with her in detail, and suggested that she consider a trial of an SSRI to see if this helped with her symptoms.  She indicated that she was willing to try, and will start with a small dose of Lexapro.  Will send a prescription for 5 mg escitalopram, but told her to start at 2.5 mg/day for the first week.  Warned re: GI upset, etc.\par \par See for follow-up in 4 months.  CMP, lipids, A1c, TFTs, microalbumin before the visit [FreeTextEntry2] : Diet, potential benefits/side-effects of SSRIs

## 2021-10-26 NOTE — DATA REVIEWED
[FreeTextEntry1] : [facility]  (10/13/21)\par \par FBS 94,  A1c 6.2%\par CMP and CBC WNL\par , , TG 64\par Urine microalbumin ratio negative at 12  (normal < 30)\par Free T4 1.92 ng/dl\par TSH 0.254  (0.45-4.5)\par Thyroglobulin level < 0.2 ng/ml

## 2021-10-26 NOTE — PHYSICAL EXAM
[Alert] : alert [Well Nourished] : well nourished [Healthy Appearance] : healthy appearance [Normal Sclera/Conjunctiva] : normal sclera/conjunctiva [EOMI] : extra ocular movement intact [No Proptosis] : no proptosis [No Lid Lag] : no lid lag [Normal Outer Ear/Nose] : the ears and nose were normal in appearance [Normal Hearing] : hearing was normal [No Neck Mass] : no neck mass was observed [No LAD] : no lymphadenopathy [Clear to Auscultation] : lungs were clear to auscultation bilaterally [No Murmurs] : no murmurs [Normal Rate] : heart rate was normal [Regular Rhythm] : with a regular rhythm [Carotids Normal] : carotid pulses were normal with no bruits [No Edema] : no peripheral edema [Not Tender] : non-tender [Soft] : abdomen soft [No HSM] : no hepato-splenomegaly [Normal Supraclavicular Nodes] : no supraclavicular lymphadenopathy [Normal Anterior Cervical Nodes] : no anterior cervical lymphadenopathy [No CVA Tenderness] : no ~M costovertebral angle tenderness [Normal Gait] : normal gait [No Involuntary Movements] : no involuntary movements were seen [No Joint Swelling] : no joint swelling seen [Normal Strength/Tone] : muscle strength and tone were normal [No Rash] : no rash [1+] : 1+ in the dorsalis pedis [Normal] : normal [0] : 0 in the posterior tibialis [2+] : 2+ in the dorsalis pedis [Vibration Dec.] : diminished vibratory sensation at the level of the toes [No Tremors] : no tremors [Normal Sensation on Monofilament Testing] : normal sensation on monofilament testing of lower extremities [Normal Affect] : the affect was normal [Normal Mood] : the mood was normal [Right foot was examined, including] : right foot ~C was examined, including visual inspection with sensory and pulse exams [Left foot was examined, including] : left foot ~C was examined, including visual inspection with sensory and pulse exams [Kyphosis] : no kyphosis present [Acanthosis Nigricans] : no acanthosis nigricans [Foot Ulcers] : no foot ulcers [Delayed in the Right Toes] : normal in the toes [Delayed in the Left Toes] : normal in the toes [Position Sense Dec.] : normal position sense at the level of the toes [Diminished Throughout Both Feet] : normal tactile sensation with monofilament testing throughout both feet [#1 Diminished] : number 1 was normal [#2 Diminished] : number 2 was normal [#3 Diminished] : number 3 was normal [#5 Diminished] : number 5 was normal [#4 Diminished] : number 4 was normal [#6 Diminished] : number 6 was normal [#7 Diminished] : number 7 was normal [#8 Diminished] : number 8 was normal [#9 Diminished] : number 9 was normal [#10 Diminished] : number 10 was normal [de-identified] : Pupils miotic.  Dense corneal arcus [de-identified] : Thyroidectomy scar.  No palpable thyroid tissue [de-identified] : DP pulses 2+ on the left, 1+ on the right [de-identified] : Vibratory sensation significantly decreased over the toes

## 2021-10-26 NOTE — ADDENDUM
[FreeTextEntry1] : Thyroid ultrasound report from 10/22 was reviewed and discussed with the pt on 10/26/21--\par Imaging was negative for recurrent disease in the thyroid bed or pathologic adenopathy

## 2021-10-26 NOTE — HISTORY OF PRESENT ILLNESS
[FreeTextEntry1] : 71-year-old woman who is followed for type 2 DM, hyperlipidemia, hypertension, papillary thyroid CA and post-surgical hypothyroidism.  She is s/p partial left thyroid lobectomy in 1969, followed by a completion thyroidectomy in 2000.  The pathology after the latter surgery showed follicular variant of papillary CA, without any local extension or vascular invasion.  She received 75 mCi I-131 post-op to ablate a large left lobe remnant.  She has been free of any recurrent or metastatic disease since that time.  Her diabetes was diagnosed in December of 2016, having progressed after a 3-year history of pre-diabetes. \par \michael Was hospitalized at [facility] on 9/23 after experiencing sudden onset of chest tightness, L arm numbness and SOB. Had bloodwork to rule out an MI and was discharged.  Was then re-admitted on 9/27 with the same symptoms, and had a coronary angio which was apparently negative. Had a third admission on 10/1 for the same symptoms, and was told that there was "a problem with the lungs."  She has now had a repeat CXR ordered by her new PCP (Dr. Arsh Billy) but does not know the results.\par PFTs done on 10/6 were close to normal.  An abdominal ultrasound was negative for hepatic steatosis (which had been seen on an U/S a few years ago), cholelithiasis or AAA.\par Recent fingersticks and diet:\par --Pre-breakfast fingersticks have been .  \par --Readings taken 2 hrs after breakfast (which is oatmeal and apple) are 120-130\par --Lunch and supper are hot meals with steamed vegetables and either chicken or salmon.  Fingersticks after these meals are also 120-130.\par Has lost another 2 lb in weight\par BPs checked at home have been 120-130/65-75\par Still with intermittent headaches (though has had an MRI which was negative) and intermittent "dizziness" (which does not seem to correspond to true vertigo)\par Her last ophth exam was at the beginning of this year, and was negative.

## 2021-10-26 NOTE — HISTORY OF PRESENT ILLNESS
[FreeTextEntry1] : 71-year-old woman who is followed for type 2 DM, hyperlipidemia, hypertension, papillary thyroid CA and post-surgical hypothyroidism.  She is s/p partial left thyroid lobectomy in 1969, followed by a completion thyroidectomy in 2000.  The pathology after the latter surgery showed follicular variant of papillary CA, without any local extension or vascular invasion.  She received 75 mCi I-131 post-op to ablate a large left lobe remnant.  She has been free of any recurrent or metastatic disease since that time.  Her diabetes was diagnosed in December of 2016, having progressed after a 3-year history of pre-diabetes. \par \michael Was hospitalized at [facility] on 9/23 after experiencing sudden onset of chest tightness, L arm numbness and SOB. Had bloodwork to rule out an MI and was discharged.  Was then re-admitted on 9/27 with the same symptoms, and had a coronary angio which was apparently negative. Had a third admission on 10/1 for the same symptoms, and was told that there was "a problem with the lungs."  She has now had a repeat CXR ordered by her new PCP (Dr. Arsh Billy) but does not know the results.\par PFTs done on 10/6 were close to normal.  An abdominal ultrasound was negative for hepatic steatosis (which had been seen on an U/S a few years ago), cholelithiasis or AAA.\par Recent fingersticks and diet:\par --Pre-breakfast fingersticks have been .  \par --Readings taken 2 hrs after breakfast (which is oatmeal and apple) are 120-130\par --Lunch and supper are hot meals with steamed vegetables and either chicken or salmon.  Fingersticks after these meals are also 120-130.\par Has lost another 2 lb in weight\par BPs checked at home have been 120-130/65-75\par Still with intermittent headaches (though has had an MRI which was negative) and intermittent "dizziness" (which does not seem to correspond to true vertigo)\par Her last ophth exam was at the beginning of this year, and was negative.

## 2021-11-02 ENCOUNTER — INPATIENT (INPATIENT)
Facility: HOSPITAL | Age: 71
LOS: 2 days | Discharge: HOME | End: 2021-11-05
Attending: SURGERY | Admitting: SURGERY
Payer: MEDICARE

## 2021-11-02 VITALS
HEART RATE: 56 BPM | DIASTOLIC BLOOD PRESSURE: 79 MMHG | WEIGHT: 130.07 LBS | SYSTOLIC BLOOD PRESSURE: 135 MMHG | RESPIRATION RATE: 18 BRPM | TEMPERATURE: 97 F | OXYGEN SATURATION: 100 %

## 2021-11-02 DIAGNOSIS — H40.9 UNSPECIFIED GLAUCOMA: ICD-10-CM

## 2021-11-02 DIAGNOSIS — K55.9 VASCULAR DISORDER OF INTESTINE, UNSPECIFIED: ICD-10-CM

## 2021-11-02 DIAGNOSIS — K56.609 UNSPECIFIED INTESTINAL OBSTRUCTION, UNSPECIFIED AS TO PARTIAL VERSUS COMPLETE OBSTRUCTION: ICD-10-CM

## 2021-11-02 DIAGNOSIS — E11.9 TYPE 2 DIABETES MELLITUS WITHOUT COMPLICATIONS: ICD-10-CM

## 2021-11-02 DIAGNOSIS — E89.0 POSTPROCEDURAL HYPOTHYROIDISM: Chronic | ICD-10-CM

## 2021-11-02 DIAGNOSIS — E87.2 ACIDOSIS: ICD-10-CM

## 2021-11-02 DIAGNOSIS — E78.5 HYPERLIPIDEMIA, UNSPECIFIED: ICD-10-CM

## 2021-11-02 DIAGNOSIS — K44.9 DIAPHRAGMATIC HERNIA WITHOUT OBSTRUCTION OR GANGRENE: ICD-10-CM

## 2021-11-02 DIAGNOSIS — K21.9 GASTRO-ESOPHAGEAL REFLUX DISEASE WITHOUT ESOPHAGITIS: ICD-10-CM

## 2021-11-02 DIAGNOSIS — I10 ESSENTIAL (PRIMARY) HYPERTENSION: ICD-10-CM

## 2021-11-02 DIAGNOSIS — Z79.84 LONG TERM (CURRENT) USE OF ORAL HYPOGLYCEMIC DRUGS: ICD-10-CM

## 2021-11-02 DIAGNOSIS — K56.2 VOLVULUS: ICD-10-CM

## 2021-11-02 DIAGNOSIS — E89.0 POSTPROCEDURAL HYPOTHYROIDISM: ICD-10-CM

## 2021-11-02 LAB
ALBUMIN SERPL ELPH-MCNC: 4.7 G/DL — SIGNIFICANT CHANGE UP (ref 3.5–5.2)
ALP SERPL-CCNC: 69 U/L — SIGNIFICANT CHANGE UP (ref 30–115)
ALT FLD-CCNC: 16 U/L — SIGNIFICANT CHANGE UP (ref 0–41)
ANION GAP SERPL CALC-SCNC: 15 MMOL/L — HIGH (ref 7–14)
ANION GAP SERPL CALC-SCNC: 15 MMOL/L — HIGH (ref 7–14)
APTT BLD: 31.7 SEC — SIGNIFICANT CHANGE UP (ref 27–39.2)
AST SERPL-CCNC: 48 U/L — HIGH (ref 0–41)
BASOPHILS # BLD AUTO: 0.02 K/UL — SIGNIFICANT CHANGE UP (ref 0–0.2)
BASOPHILS # BLD AUTO: 0.05 K/UL — SIGNIFICANT CHANGE UP (ref 0–0.2)
BASOPHILS NFR BLD AUTO: 0.3 % — SIGNIFICANT CHANGE UP (ref 0–1)
BASOPHILS NFR BLD AUTO: 0.6 % — SIGNIFICANT CHANGE UP (ref 0–1)
BILIRUB DIRECT SERPL-MCNC: <0.2 MG/DL — SIGNIFICANT CHANGE UP (ref 0–0.2)
BILIRUB INDIRECT FLD-MCNC: >0.1 MG/DL — LOW (ref 0.2–1.2)
BILIRUB SERPL-MCNC: 0.3 MG/DL — SIGNIFICANT CHANGE UP (ref 0.2–1.2)
BUN SERPL-MCNC: 12 MG/DL — SIGNIFICANT CHANGE UP (ref 10–20)
BUN SERPL-MCNC: 20 MG/DL — SIGNIFICANT CHANGE UP (ref 10–20)
CALCIUM SERPL-MCNC: 8.5 MG/DL — SIGNIFICANT CHANGE UP (ref 8.5–10.1)
CALCIUM SERPL-MCNC: 9.3 MG/DL — SIGNIFICANT CHANGE UP (ref 8.5–10.1)
CHLORIDE SERPL-SCNC: 100 MMOL/L — SIGNIFICANT CHANGE UP (ref 98–110)
CHLORIDE SERPL-SCNC: 102 MMOL/L — SIGNIFICANT CHANGE UP (ref 98–110)
CO2 SERPL-SCNC: 23 MMOL/L — SIGNIFICANT CHANGE UP (ref 17–32)
CO2 SERPL-SCNC: 27 MMOL/L — SIGNIFICANT CHANGE UP (ref 17–32)
CREAT SERPL-MCNC: 0.7 MG/DL — SIGNIFICANT CHANGE UP (ref 0.7–1.5)
CREAT SERPL-MCNC: 1 MG/DL — SIGNIFICANT CHANGE UP (ref 0.7–1.5)
EOSINOPHIL # BLD AUTO: 0 K/UL — SIGNIFICANT CHANGE UP (ref 0–0.7)
EOSINOPHIL # BLD AUTO: 0.16 K/UL — SIGNIFICANT CHANGE UP (ref 0–0.7)
EOSINOPHIL NFR BLD AUTO: 0 % — SIGNIFICANT CHANGE UP (ref 0–8)
EOSINOPHIL NFR BLD AUTO: 1.9 % — SIGNIFICANT CHANGE UP (ref 0–8)
GLUCOSE BLDC GLUCOMTR-MCNC: 155 MG/DL — HIGH (ref 70–99)
GLUCOSE BLDC GLUCOMTR-MCNC: 176 MG/DL — HIGH (ref 70–99)
GLUCOSE SERPL-MCNC: 137 MG/DL — HIGH (ref 70–99)
GLUCOSE SERPL-MCNC: 142 MG/DL — HIGH (ref 70–99)
HCT VFR BLD CALC: 33.8 % — LOW (ref 37–47)
HCT VFR BLD CALC: 38.6 % — SIGNIFICANT CHANGE UP (ref 37–47)
HGB BLD-MCNC: 11.1 G/DL — LOW (ref 12–16)
HGB BLD-MCNC: 12.8 G/DL — SIGNIFICANT CHANGE UP (ref 12–16)
IMM GRANULOCYTES NFR BLD AUTO: 0.2 % — SIGNIFICANT CHANGE UP (ref 0.1–0.3)
IMM GRANULOCYTES NFR BLD AUTO: 0.4 % — HIGH (ref 0.1–0.3)
INR BLD: 1.02 RATIO — SIGNIFICANT CHANGE UP (ref 0.65–1.3)
LACTATE SERPL-SCNC: 1.7 MMOL/L — SIGNIFICANT CHANGE UP (ref 0.7–2)
LACTATE SERPL-SCNC: 3.1 MMOL/L — HIGH (ref 0.7–2)
LIDOCAIN IGE QN: 45 U/L — SIGNIFICANT CHANGE UP (ref 7–60)
LYMPHOCYTES # BLD AUTO: 0.8 K/UL — LOW (ref 1.2–3.4)
LYMPHOCYTES # BLD AUTO: 1.18 K/UL — LOW (ref 1.2–3.4)
LYMPHOCYTES # BLD AUTO: 10.3 % — LOW (ref 20.5–51.1)
LYMPHOCYTES # BLD AUTO: 13.9 % — LOW (ref 20.5–51.1)
MAGNESIUM SERPL-MCNC: 1.5 MG/DL — LOW (ref 1.8–2.4)
MCHC RBC-ENTMCNC: 29.6 PG — SIGNIFICANT CHANGE UP (ref 27–31)
MCHC RBC-ENTMCNC: 29.7 PG — SIGNIFICANT CHANGE UP (ref 27–31)
MCHC RBC-ENTMCNC: 32.8 G/DL — SIGNIFICANT CHANGE UP (ref 32–37)
MCHC RBC-ENTMCNC: 33.2 G/DL — SIGNIFICANT CHANGE UP (ref 32–37)
MCV RBC AUTO: 89.1 FL — SIGNIFICANT CHANGE UP (ref 81–99)
MCV RBC AUTO: 90.4 FL — SIGNIFICANT CHANGE UP (ref 81–99)
MONOCYTES # BLD AUTO: 0.41 K/UL — SIGNIFICANT CHANGE UP (ref 0.1–0.6)
MONOCYTES # BLD AUTO: 0.67 K/UL — HIGH (ref 0.1–0.6)
MONOCYTES NFR BLD AUTO: 4.8 % — SIGNIFICANT CHANGE UP (ref 1.7–9.3)
MONOCYTES NFR BLD AUTO: 8.6 % — SIGNIFICANT CHANGE UP (ref 1.7–9.3)
NEUTROPHILS # BLD AUTO: 6.28 K/UL — SIGNIFICANT CHANGE UP (ref 1.4–6.5)
NEUTROPHILS # BLD AUTO: 6.66 K/UL — HIGH (ref 1.4–6.5)
NEUTROPHILS NFR BLD AUTO: 78.6 % — HIGH (ref 42.2–75.2)
NEUTROPHILS NFR BLD AUTO: 80.4 % — HIGH (ref 42.2–75.2)
NRBC # BLD: 0 /100 WBCS — SIGNIFICANT CHANGE UP (ref 0–0)
NRBC # BLD: 0 /100 WBCS — SIGNIFICANT CHANGE UP (ref 0–0)
PHOSPHATE SERPL-MCNC: 3.9 MG/DL — SIGNIFICANT CHANGE UP (ref 2.1–4.9)
PLATELET # BLD AUTO: 158 K/UL — SIGNIFICANT CHANGE UP (ref 130–400)
PLATELET # BLD AUTO: 207 K/UL — SIGNIFICANT CHANGE UP (ref 130–400)
POTASSIUM SERPL-MCNC: 4.6 MMOL/L — SIGNIFICANT CHANGE UP (ref 3.5–5)
POTASSIUM SERPL-MCNC: 5.4 MMOL/L — HIGH (ref 3.5–5)
POTASSIUM SERPL-SCNC: 4.6 MMOL/L — SIGNIFICANT CHANGE UP (ref 3.5–5)
POTASSIUM SERPL-SCNC: 5.4 MMOL/L — HIGH (ref 3.5–5)
PROT SERPL-MCNC: 7.1 G/DL — SIGNIFICANT CHANGE UP (ref 6–8)
PROTHROM AB SERPL-ACNC: 11.7 SEC — SIGNIFICANT CHANGE UP (ref 9.95–12.87)
RBC # BLD: 3.74 M/UL — LOW (ref 4.2–5.4)
RBC # BLD: 4.33 M/UL — SIGNIFICANT CHANGE UP (ref 4.2–5.4)
RBC # FLD: 13.7 % — SIGNIFICANT CHANGE UP (ref 11.5–14.5)
RBC # FLD: 14.2 % — SIGNIFICANT CHANGE UP (ref 11.5–14.5)
SARS-COV-2 RNA SPEC QL NAA+PROBE: SIGNIFICANT CHANGE UP
SODIUM SERPL-SCNC: 140 MMOL/L — SIGNIFICANT CHANGE UP (ref 135–146)
SODIUM SERPL-SCNC: 142 MMOL/L — SIGNIFICANT CHANGE UP (ref 135–146)
TROPONIN T SERPL-MCNC: <0.01 NG/ML — SIGNIFICANT CHANGE UP
WBC # BLD: 7.8 K/UL — SIGNIFICANT CHANGE UP (ref 4.8–10.8)
WBC # BLD: 8.48 K/UL — SIGNIFICANT CHANGE UP (ref 4.8–10.8)
WBC # FLD AUTO: 7.8 K/UL — SIGNIFICANT CHANGE UP (ref 4.8–10.8)
WBC # FLD AUTO: 8.48 K/UL — SIGNIFICANT CHANGE UP (ref 4.8–10.8)

## 2021-11-02 PROCEDURE — 71045 X-RAY EXAM CHEST 1 VIEW: CPT | Mod: 26

## 2021-11-02 PROCEDURE — G1004: CPT

## 2021-11-02 PROCEDURE — 74177 CT ABD & PELVIS W/CONTRAST: CPT | Mod: 26,ME

## 2021-11-02 PROCEDURE — 99285 EMERGENCY DEPT VISIT HI MDM: CPT

## 2021-11-02 PROCEDURE — 99285 EMERGENCY DEPT VISIT HI MDM: CPT | Mod: 57

## 2021-11-02 PROCEDURE — 93010 ELECTROCARDIOGRAM REPORT: CPT

## 2021-11-02 PROCEDURE — 44180 LAP ENTEROLYSIS: CPT

## 2021-11-02 RX ORDER — CHOLECALCIFEROL (VITAMIN D3) 125 MCG
1 CAPSULE ORAL
Qty: 0 | Refills: 0 | DISCHARGE

## 2021-11-02 RX ORDER — SODIUM CHLORIDE 9 MG/ML
1000 INJECTION, SOLUTION INTRAVENOUS
Refills: 0 | Status: DISCONTINUED | OUTPATIENT
Start: 2021-11-02 | End: 2021-11-03

## 2021-11-02 RX ORDER — SIMETHICONE 80 MG/1
160 TABLET, CHEWABLE ORAL ONCE
Refills: 0 | Status: COMPLETED | OUTPATIENT
Start: 2021-11-02 | End: 2021-11-02

## 2021-11-02 RX ORDER — SODIUM CHLORIDE 9 MG/ML
3 INJECTION INTRAMUSCULAR; INTRAVENOUS; SUBCUTANEOUS EVERY 8 HOURS
Refills: 0 | Status: DISCONTINUED | OUTPATIENT
Start: 2021-11-02 | End: 2021-11-02

## 2021-11-02 RX ORDER — CHLORHEXIDINE GLUCONATE 213 G/1000ML
1 SOLUTION TOPICAL DAILY
Refills: 0 | Status: DISCONTINUED | OUTPATIENT
Start: 2021-11-02 | End: 2021-11-05

## 2021-11-02 RX ORDER — METRONIDAZOLE 500 MG
500 TABLET ORAL ONCE
Refills: 0 | Status: COMPLETED | OUTPATIENT
Start: 2021-11-02 | End: 2021-11-02

## 2021-11-02 RX ORDER — FAMOTIDINE 10 MG/ML
20 INJECTION INTRAVENOUS ONCE
Refills: 0 | Status: COMPLETED | OUTPATIENT
Start: 2021-11-02 | End: 2021-11-02

## 2021-11-02 RX ORDER — ATORVASTATIN CALCIUM 80 MG/1
80 TABLET, FILM COATED ORAL AT BEDTIME
Refills: 0 | Status: DISCONTINUED | OUTPATIENT
Start: 2021-11-02 | End: 2021-11-05

## 2021-11-02 RX ORDER — PANTOPRAZOLE SODIUM 20 MG/1
40 TABLET, DELAYED RELEASE ORAL DAILY
Refills: 0 | Status: DISCONTINUED | OUTPATIENT
Start: 2021-11-02 | End: 2021-11-05

## 2021-11-02 RX ORDER — ROSUVASTATIN CALCIUM 5 MG/1
1 TABLET ORAL
Qty: 0 | Refills: 0 | DISCHARGE

## 2021-11-02 RX ORDER — TEMAZEPAM 15 MG/1
1 CAPSULE ORAL
Qty: 0 | Refills: 0 | DISCHARGE

## 2021-11-02 RX ORDER — METRONIDAZOLE 500 MG
500 TABLET ORAL EVERY 8 HOURS
Refills: 0 | Status: DISCONTINUED | OUTPATIENT
Start: 2021-11-02 | End: 2021-11-04

## 2021-11-02 RX ORDER — MORPHINE SULFATE 50 MG/1
2 CAPSULE, EXTENDED RELEASE ORAL ONCE
Refills: 0 | Status: DISCONTINUED | OUTPATIENT
Start: 2021-11-02 | End: 2021-11-02

## 2021-11-02 RX ORDER — METFORMIN HYDROCHLORIDE 850 MG/1
1 TABLET ORAL
Qty: 0 | Refills: 0 | DISCHARGE

## 2021-11-02 RX ORDER — MORPHINE SULFATE 50 MG/1
4 CAPSULE, EXTENDED RELEASE ORAL ONCE
Refills: 0 | Status: DISCONTINUED | OUTPATIENT
Start: 2021-11-02 | End: 2021-11-02

## 2021-11-02 RX ORDER — BENZOCAINE 10 %
1 GEL (GRAM) MUCOUS MEMBRANE THREE TIMES A DAY
Refills: 0 | Status: DISCONTINUED | OUTPATIENT
Start: 2021-11-02 | End: 2021-11-02

## 2021-11-02 RX ORDER — PANTOPRAZOLE SODIUM 20 MG/1
40 TABLET, DELAYED RELEASE ORAL DAILY
Refills: 0 | Status: DISCONTINUED | OUTPATIENT
Start: 2021-11-02 | End: 2021-11-02

## 2021-11-02 RX ORDER — AMLODIPINE BESYLATE 2.5 MG/1
5 TABLET ORAL DAILY
Refills: 0 | Status: DISCONTINUED | OUTPATIENT
Start: 2021-11-02 | End: 2021-11-05

## 2021-11-02 RX ORDER — METRONIDAZOLE 500 MG
TABLET ORAL
Refills: 0 | Status: DISCONTINUED | OUTPATIENT
Start: 2021-11-02 | End: 2021-11-04

## 2021-11-02 RX ORDER — KETOROLAC TROMETHAMINE 30 MG/ML
15 SYRINGE (ML) INJECTION EVERY 6 HOURS
Refills: 0 | Status: DISCONTINUED | OUTPATIENT
Start: 2021-11-02 | End: 2021-11-03

## 2021-11-02 RX ORDER — LEVOTHYROXINE SODIUM 125 MCG
100 TABLET ORAL DAILY
Refills: 0 | Status: DISCONTINUED | OUTPATIENT
Start: 2021-11-02 | End: 2021-11-03

## 2021-11-02 RX ORDER — ENOXAPARIN SODIUM 100 MG/ML
40 INJECTION SUBCUTANEOUS DAILY
Refills: 0 | Status: DISCONTINUED | OUTPATIENT
Start: 2021-11-02 | End: 2021-11-02

## 2021-11-02 RX ORDER — LEVOTHYROXINE SODIUM 125 MCG
1 TABLET ORAL
Qty: 0 | Refills: 0 | DISCHARGE

## 2021-11-02 RX ORDER — OMEGA-3 ACID ETHYL ESTERS 1 G
1 CAPSULE ORAL
Qty: 0 | Refills: 0 | DISCHARGE

## 2021-11-02 RX ORDER — ACETAMINOPHEN 500 MG
1000 TABLET ORAL ONCE
Refills: 0 | Status: DISCONTINUED | OUTPATIENT
Start: 2021-11-02 | End: 2021-11-02

## 2021-11-02 RX ORDER — CHLORHEXIDINE GLUCONATE 213 G/1000ML
1 SOLUTION TOPICAL DAILY
Refills: 0 | Status: DISCONTINUED | OUTPATIENT
Start: 2021-11-02 | End: 2021-11-02

## 2021-11-02 RX ORDER — SODIUM CHLORIDE 9 MG/ML
1000 INJECTION INTRAMUSCULAR; INTRAVENOUS; SUBCUTANEOUS ONCE
Refills: 0 | Status: COMPLETED | OUTPATIENT
Start: 2021-11-02 | End: 2021-11-02

## 2021-11-02 RX ORDER — PREGABALIN 225 MG/1
1000 CAPSULE ORAL
Qty: 0 | Refills: 0 | DISCHARGE

## 2021-11-02 RX ORDER — CIPROFLOXACIN LACTATE 400MG/40ML
400 VIAL (ML) INTRAVENOUS ONCE
Refills: 0 | Status: COMPLETED | OUTPATIENT
Start: 2021-11-02 | End: 2021-11-02

## 2021-11-02 RX ORDER — CIPROFLOXACIN LACTATE 400MG/40ML
VIAL (ML) INTRAVENOUS
Refills: 0 | Status: DISCONTINUED | OUTPATIENT
Start: 2021-11-02 | End: 2021-11-04

## 2021-11-02 RX ORDER — ENOXAPARIN SODIUM 100 MG/ML
40 INJECTION SUBCUTANEOUS DAILY
Refills: 0 | Status: DISCONTINUED | OUTPATIENT
Start: 2021-11-02 | End: 2021-11-05

## 2021-11-02 RX ORDER — LEVOTHYROXINE SODIUM 125 MCG
100 TABLET ORAL AT BEDTIME
Refills: 0 | Status: DISCONTINUED | OUTPATIENT
Start: 2021-11-02 | End: 2021-11-02

## 2021-11-02 RX ORDER — OMEPRAZOLE 10 MG/1
1 CAPSULE, DELAYED RELEASE ORAL
Qty: 0 | Refills: 0 | DISCHARGE

## 2021-11-02 RX ORDER — SODIUM CHLORIDE 9 MG/ML
1000 INJECTION, SOLUTION INTRAVENOUS
Refills: 0 | Status: DISCONTINUED | OUTPATIENT
Start: 2021-11-02 | End: 2021-11-02

## 2021-11-02 RX ORDER — CIPROFLOXACIN LACTATE 400MG/40ML
400 VIAL (ML) INTRAVENOUS EVERY 12 HOURS
Refills: 0 | Status: DISCONTINUED | OUTPATIENT
Start: 2021-11-03 | End: 2021-11-04

## 2021-11-02 RX ORDER — ASPIRIN/CALCIUM CARB/MAGNESIUM 324 MG
1 TABLET ORAL
Qty: 0 | Refills: 0 | DISCHARGE

## 2021-11-02 RX ADMIN — SODIUM CHLORIDE 1000 MILLILITER(S): 9 INJECTION INTRAMUSCULAR; INTRAVENOUS; SUBCUTANEOUS at 01:55

## 2021-11-02 RX ADMIN — SODIUM CHLORIDE 100 MILLILITER(S): 9 INJECTION, SOLUTION INTRAVENOUS at 18:24

## 2021-11-02 RX ADMIN — FAMOTIDINE 104 MILLIGRAM(S): 10 INJECTION INTRAVENOUS at 01:49

## 2021-11-02 RX ADMIN — MORPHINE SULFATE 2 MILLIGRAM(S): 50 CAPSULE, EXTENDED RELEASE ORAL at 01:49

## 2021-11-02 RX ADMIN — ENOXAPARIN SODIUM 40 MILLIGRAM(S): 100 INJECTION SUBCUTANEOUS at 18:23

## 2021-11-02 RX ADMIN — Medication 100 MICROGRAM(S): at 22:17

## 2021-11-02 RX ADMIN — Medication 100 MILLIGRAM(S): at 15:42

## 2021-11-02 RX ADMIN — Medication 100 MILLIGRAM(S): at 22:17

## 2021-11-02 RX ADMIN — MORPHINE SULFATE 2 MILLIGRAM(S): 50 CAPSULE, EXTENDED RELEASE ORAL at 07:25

## 2021-11-02 RX ADMIN — ATORVASTATIN CALCIUM 80 MILLIGRAM(S): 80 TABLET, FILM COATED ORAL at 22:17

## 2021-11-02 RX ADMIN — MORPHINE SULFATE 4 MILLIGRAM(S): 50 CAPSULE, EXTENDED RELEASE ORAL at 02:48

## 2021-11-02 RX ADMIN — SIMETHICONE 160 MILLIGRAM(S): 80 TABLET, CHEWABLE ORAL at 04:51

## 2021-11-02 RX ADMIN — AMLODIPINE BESYLATE 5 MILLIGRAM(S): 2.5 TABLET ORAL at 22:19

## 2021-11-02 RX ADMIN — SODIUM CHLORIDE 100 MILLILITER(S): 9 INJECTION, SOLUTION INTRAVENOUS at 10:24

## 2021-11-02 RX ADMIN — Medication 200 MILLIGRAM(S): at 15:42

## 2021-11-02 RX ADMIN — CHLORHEXIDINE GLUCONATE 1 APPLICATION(S): 213 SOLUTION TOPICAL at 22:18

## 2021-11-02 NOTE — ED PROVIDER NOTE - CLINICAL SUMMARY MEDICAL DECISION MAKING FREE TEXT BOX
71yF HTN DLD GERD hypothyroid p/w LUQ abd pain during/after dinner.  Pt still passing gas but abd increasingly distended.  Labs reassuring.  CT imaging suggestive of sm bowel obstruction w/ transition point.  Gen surg consulted, recommend NGT (that they will place) and admission to medicine for initially nonop management.

## 2021-11-02 NOTE — CONSULT NOTE ADULT - SUBJECTIVE AND OBJECTIVE BOX
PRISCILLA PINEDA [id number]  71y Female    HPI:  Pt is a 72 y/o F with a PMHx of HTN, HLD, DB, Hypothyroidism, glaucoma, s/p partial thyroidectomy presents to the ED with Epigastric abdominal pain that was worse after eating diner at 9:54pm yesterday. Pt's daughter states that the pt has been having some daily gas pain so she gave the pt Tums yesterday which helped the relieve the pain for 10mins. After 10mins the pain return worse. Pt denies burning sensation, N/V/D. States that she has been having BM and passing flatus regularly. PT dnies abdominal distention, CP, HA, SOB, Vision changes and dysuria. PT states that she last had a endoscopy in [place] recently in 2020 and her last colonoscopy was in 2015.     Surgery was consulted to r/o potential SBO seen on CT a/p. In the ED pt was VS, afebrile, physical exam showed epigastric and LUQ tenderness w/o rebound or guarding. Laboratory did not show an acute inflammatory process. CT a/p showed a dilated stomach, hiatal hernia, and dilated loops of small bowel with a transition point. NGT was place with some difficulty and 800cc of thick partially digested stomach contents was evacuated on insertion.     PAST MEDICAL & SURGICAL HISTORY:  Hypertension    Diabetes    Hypothyroid    Dyslipidemia    Glaucoma    S/P partial thyroidectomy    MEDICATIONS  (STANDING):  morphine  - Injectable 2 milliGRAM(s) IV Push once  sodium chloride 0.9% lock flush 3 milliLiter(s) IV Push every 8 hours    MEDICATIONS  (PRN):    Allergies    hydrALAZINE (Other)  latex (Unknown)  penicillin (Rash)    Intolerances    REVIEW OF SYSTEMS    [x] A ten-point review of systems was otherwise negative except as noted.  [ ] Due to altered mental status/intubation, subjective information were not able to be obtained from the patient. History was obtained, to the extent possible, from review of the chart and collateral sources of information.    Vital Signs Last 24 Hrs  T(C): 36 (02 Nov 2021 00:35), Max: 36 (02 Nov 2021 00:35)  T(F): 96.8 (02 Nov 2021 00:35), Max: 96.8 (02 Nov 2021 00:35)  HR: 58 (02 Nov 2021 02:53) (56 - 58)  BP: 141/78 (02 Nov 2021 02:53) (135/79 - 141/78)  BP(mean): --  RR: 18 (02 Nov 2021 00:35) (18 - 18)  SpO2: 100% (02 Nov 2021 00:35) (100% - 100%)    PHYSICAL EXAM:  GENERAL: NAD, well-appearing NGT in left nares  CHEST/LUNG: Clear to auscultation bilaterally  HEART: Regular rate and rhythm  ABDOMEN: Soft, mildly tender over the epigastric region, Nondistended;   EXTREMITIES:  No clubbing, cyanosis, or edema    LABS:  Labs:  CAPILLARY BLOOD GLUCOSE                       12.8   8.48  )-----------( 207      ( 02 Nov 2021 01:19 )             38.6       Auto Neutrophil %: 78.6 % (11-02-21 @ 01:19)  Auto Immature Granulocyte %: 0.2 % (11-02-21 @ 01:19)    11-02    142  |  100  |  20  ----------------------------<  142<H>  4.6   |  27  |  1.0      Calcium, Total Serum: 9.3 mg/dL (11-02-21 @ 01:19)    LFTs:             7.1  | 0.3  | 48       ------------------[69      ( 02 Nov 2021 01:19 )  4.7  | <0.2 | 16          Lipase:45     Amylase:x         Lactate, Blood: 1.7 mmol/L (11-02-21 @ 01:19)    Coags:    CARDIAC MARKERS ( 02 Nov 2021 01:19 )  x     / <0.01 ng/mL / x     / x     / x          RADIOLOGY & ADDITIONAL STUDIES:  < from: CT Abdomen and Pelvis w/ IV Cont (11.02.21 @ 03:21) >  IMPRESSION:    Several mildly distended loops of small bowel with fluid, up to 3.1 cm diameter, with focal transition to collapsed bowel within the right hemiabdomen. Just proximal to the transition point, there is abnormal circumferential wall thickening of small bowel with mucosal hyperemia. Findings compatible with enteritis, with possible small bowel obstruction.    < end of copied text >

## 2021-11-02 NOTE — H&P ADULT - NSHPLABSRESULTS_GEN_ALL_CORE
LABS:             12.8   8.48  )-----------( 207      ( 11-02 @ 01:19 )             38.6                142   |  100   |  20                 Ca: 9.3    BMP:   ----------------------------< 142    Mg: x     (11-02-21 @ 01:19)             4.6    |  27    | 1.0                Ph: x        LFT:     TPro: 7.1 / Alb: 4.7 / TBili: 0.3 / DBili: <0.2 / AST: 48 / ALT: 16 / AlkPhos: 69   (11-02-21 @ 01:19)    PT/INR - ( 02 Nov 2021 08:31 )   PT: 11.70 sec;   INR: 1.02 ratio    PTT - ( 02 Nov 2021 08:31 )  PTT:31.7 sec    CARDIAC MARKERS ( 02 Nov 2021 01:19 )  x     / <0.01 ng/mL / x     / x     / x          RADIOLOGY & ADDITIONAL STUDIES:  < from: CT Abdomen and Pelvis w/ IV Cont (11.02.21 @ 03:21) >  IMPRESSION:    Several mildly distended loops of small bowel with fluid, up to 3.1 cm diameter, with focal transition to collapsed bowel within the right hemiabdomen. Just proximal to the transition point, there is abnormal circumferential wall thickening of small bowel with mucosal hyperemia. Findings compatible with enteritis, with possible small bowel obstruction.  < end of copied text >

## 2021-11-02 NOTE — CONSULT NOTE ADULT - ASSESSMENT
ASSESSMENT:  Pt is a 72 y/o F with a PMHx of HTN, HLD, DB, Hypothyroidism, glaucoma, s/p partial thyroidectomy presents to the ED with Epigastric abdominal pain that was worse after eating diner. Lab and CT findings as documented above    PLAN:  - monitor NGT output, frequent flushes due to thick secretions  - f/u CXR for NGT placement  - GI consult for possible enteritis  - NPO - strict  - DVT and GI ppx  - IV medication  - IVF    Lines/Tubes: PIV, NGT    Above plan discussed with Dr. Mehta    Blue:[id number]   ASSESSMENT:  Pt is a 72 y/o F with a PMHx of HTN, HLD, DB, Hypothyroidism, glaucoma, s/p partial thyroidectomy presents to the ED with Epigastric abdominal pain that was worse after eating diner. Lab and CT findings as documented above    PLAN:  - no acute sx intervention  - conservative management  - monitor NGT output, frequent flushes due to thick secretions  - f/u CXR for NGT placement  - GI consult for possible enteritis  - NPO - strict  - DVT and GI ppx  - IV medication  - IVF    Lines/Tubes: PIV, NGT    Above plan discussed with Dr. Mehta    Blue:[id number]   ASSESSMENT:  Pt is a 72 y/o F with a PMHx of HTN, HLD, DB, Hypothyroidism, glaucoma, s/p partial thyroidectomy presents to the ED with Epigastric abdominal pain that was worse after eating diner. Lab and CT findings as documented above    PLAN:  - Patient added on and consented for exploratory laparoscopy, possible bowel resection, possible ostomy, and all indicated procedures  - F/u COVID  - F/u pre-op labs --> coags and type and screen still needed  - monitor NGT output, frequent flushes due to thick secretions  - f/u CXR for NGT placement  - NPO - strict  - DVT and GI ppx  - IV medication  - IVF    Lines/Tubes: PIV, NGT    Above plan discussed with Dr. Mehta    Blue:[id number]   ASSESSMENT:  Pt is a 70 y/o F with a PMHx of HTN, HLD, DB, Hypothyroidism, glaucoma, s/p partial thyroidectomy presents to the ED with Epigastric abdominal pain that was worse after eating diner. Lab and CT findings as documented above    PLAN:  - Patient added on and consented for exploratory laparoscopy, possible bowel resection, possible ostomy, and all indicated procedures  - F/u COVID  - F/u pre-op labs --> coags and type and screen still needed  - monitor NGT output, approx 500cc on insertion, frequent flushes due to thick secretions  - f/u CXR for NGT placement  - NPO - strict  - DVT and GI ppx  - IV medication  - IVF    Senior Resident Addendum  Narrative as above, patient re-evaluated with persistent abdominal pain in virgin abdomen, CT with inflamed small bowel, free fluid, will plan for exploratory laparoscopy to assess for bowel viability. Risks, benefits, and alternatives discussed with the patient and family.    Lines/Tubes: PIV, NGT    Above plan discussed with Dr. Mehta    Blue:[id number]

## 2021-11-02 NOTE — PATIENT PROFILE ADULT - NSPROPTRIGHTCAREGIVER_GEN_A_NUR
Lan Gallegos Nurse Msg Pool             Taltz 80 mg -  Approved - Awaiting patient call back to confirm pharmacy     Authorization Number: [id number]   Valid from 06/29/2020 to until further notice     Will notify when script can be sent to pharmacy     Pharmacy Coverage [employer] (medicare) and LIS   Patient's Co-Pay: $0     Co pay Assistance Information   Patient not eligible for assistance.     Additional Information:   Left message for patient to notify of approval     Jorge Lazo   7/1/20         declines

## 2021-11-02 NOTE — ED PROVIDER NOTE - PROGRESS NOTE DETAILS
SS: Surgery consulted, will evaluate SS: As per surgery, possible sbo vs. enteritis. Surgery will place NG tube to decompression. Patient to be admitted to an inpatient floor w surgery following. Pt notified and agreeable to plan. Case discussed with and care endorsed to medical admitting resident.

## 2021-11-02 NOTE — ED PROVIDER NOTE - PHYSICAL EXAMINATION
CONSTITUTIONAL: NAD  SKIN: Warm dry  HEAD: NCAT  EYES: NL inspection  ENT: MMM  NECK: Supple; non tender.  CARD: RRR  RESP: CTAB  ABD: S +LUQ tenderness, +hyperactive/gurgling bowel sounds  EXT: no pedal edema  NEURO: Grossly unremarkable  PSYCH: Cooperative, appropriate.

## 2021-11-02 NOTE — BRIEF OPERATIVE NOTE - OPERATION/FINDINGS
Hemorrhagic loop of ileum within omental defect with proximally dilated small bowel. Bowel released, perfusion of segment of bowel confirmed with ICG fluorescent imaging. No resection performed. Small bowel run from TI to LOT, no other areas of concern for ischemia.

## 2021-11-02 NOTE — H&P ADULT - ASSESSMENT
Pt is a 72 y/o F with a PMHx of HTN, HLD, DB, Hypothyroidism, glaucoma, s/p partial thyroidectomy presents to the ED with Epigastric abdominal pain that was worse after eating diner. Lab and CT findings as documented above    PLAN:  - Patient added on and consented for exploratory laparoscopy, possible bowel resection, possible ostomy, and all indicated procedures  - F/u COVID  - F/u pre-op labs --> coags and type and screen still needed  - monitor NGT output, approx 500cc on insertion, frequent flushes due to thick secretions  - f/u CXR for NGT placement  - NPO - strict  - DVT and GI ppx  - IV medication  - IVF    Senior Resident Addendum  Narrative as above, patient re-evaluated with persistent abdominal pain in virgin abdomen, CT with inflamed small bowel, free fluid, will plan for exploratory laparoscopy to assess for bowel viability. Risks, benefits, and alternatives discussed with the patient and family.    Lines/Tubes: PIV, NGT    Above plan discussed with Dr. Mehta

## 2021-11-02 NOTE — PATIENT PROFILE ADULT - DO YOU FEEL UNSAFE AT HOME, WORK, OR SCHOOL?
Impression: Age-related nuclear cataract, right eye: H25.11. Plan: Cataracts account for the patient's complaints. No treatment currently recommended. The patient will monitor vision changes and contact us with any decrease in vision.
Impression: Diagnosis: Low-tension glaucoma, bilateral, moderate stage. Code: S17.2740. Plan: Intraocular pressure well controlled, tolerating medications. Will continue with same regimen.  OCT 71/70, enlarged blind spot OD, early arcuate defect OS stable
no

## 2021-11-02 NOTE — BRIEF OPERATIVE NOTE - NSICDXBRIEFPROCEDURE_GEN_ALL_CORE_FT
PROCEDURES:  Diagnostic laparoscopy 02-Nov-2021 14:26:39  Zulma Antony, transversus abdominis plane, bilateral 02-Nov-2021 14:26:50  Zulma Antony

## 2021-11-02 NOTE — ED ADULT NURSE NOTE - NSICDXFAMILYHX_GEN_ALL_CORE_FT
FAMILY HISTORY:  Father  Still living? Unknown  Family history of coronary artery disease, Age at diagnosis: Age Unknown    Mother  Still living? No  Family history of coronary artery disease, Age at diagnosis: Age Unknown

## 2021-11-02 NOTE — ED PROVIDER NOTE - ATTENDING CONTRIBUTION TO CARE
70 yo male with PMH of HTN, HLD, DM, GERD, hypothyroid presents to the ER for abdominal pain since "9:54 pm". Pt states that she ate dinner early around 4 pm, and had pork belly with onions/tarrot leaves/coconut milk. Then later when she went to lay down, felt bloated, gassy, was belching a lot and began to have pain even after using TUMS. Pt can't seem to be in comfortable position. Pain mostly in epigastric area but radiates to b/l upper quads LUQ>RUQ. No fever/cough/nausea/vomiting/diarrhea/dysuria/CP/SOB/dizziness/HA/neck pain/leg swelling. Exam NCAT, PERRL, EOMI, Lungs CTAB, Heart Reg S1S2, Abdomen soft +tenderness to the epigastric and LUQ areas, very mild tenderness to RUQ +BS, no mass, non distended, Ext no edema or calf tenderness, Neuro intact. Will check labs, ekg, xray , CT scan and reassess    ALL: pcn, latex, hydralazine  PMH as above  Meds see list from daughter  SH no smoking, no etoh  PMD in [place] 70 yo male with PMH of HTN, HLD, DM, GERD, hypothyroid presents to the ER for abdominal pain since "9:54 pm". Pt states that she ate dinner early around 4 pm, and had pork belly with onions/tarrot leaves/coconut milk. Then later when she went to lay down, felt bloated, gassy, was belching a lot and began to have pain even after using TUMS. Pt can't seem to get into comfortable position. Pain mostly in epigastric area but radiates to b/l upper quads LUQ>RUQ. No fever/cough/nausea/vomiting/diarrhea/dysuria/CP/SOB/dizziness/HA/neck pain/leg swelling. Exam NCAT, PERRL, EOMI, Lungs CTAB, Heart Reg S1S2, Abdomen soft +tenderness to the epigastric and LUQ areas, very mild tenderness to RUQ +BS, no mass, non distended, Ext no edema or calf tenderness, Neuro intact. Will check labs, ekg, xray , CT scan and reassess    ALL: pcn, latex, hydralazine  PMH as above  Meds see list from daughter  SH no smoking, no etoh  PMD in [place]

## 2021-11-02 NOTE — ED ADULT NURSE NOTE - OBJECTIVE STATEMENT
Patient presents to ED in NAD a+ox3 co abdominal pain that started at 9pm and getting worse. Pt denies n/v/d, fever, chest pain, SOB.

## 2021-11-02 NOTE — H&P ADULT - NSHPPHYSICALEXAM_GEN_ALL_CORE
Vital Signs Last 24 Hrs  T(C): 36 (02 Nov 2021 00:35), Max: 36 (02 Nov 2021 00:35)  T(F): 96.8 (02 Nov 2021 00:35), Max: 96.8 (02 Nov 2021 00:35)  HR: 58 (02 Nov 2021 02:53) (56 - 58)  BP: 141/78 (02 Nov 2021 02:53) (135/79 - 141/78)  BP(mean): --  RR: 18 (02 Nov 2021 00:35) (18 - 18)  SpO2: 100% (02 Nov 2021 00:35) (100% - 100%)    PHYSICAL EXAM:  GENERAL: NAD, well-appearing NGT in left nares  CHEST/LUNG: Clear to auscultation bilaterally  HEART: Regular rate and rhythm  ABDOMEN: Soft, mildly tender over the epigastric region, Nondistended;   EXTREMITIES:  No clubbing, cyanosis, or edema

## 2021-11-02 NOTE — ED PROVIDER NOTE - OBJECTIVE STATEMENT
72 y/o f h/o htn, gerd, hld, hypothyroid p/w acute onset abdominal pain LUQ since "9:54pm." Pt was in the middle of having dinner and began having severe pain. Admits to 6 mos of abdominal pain and has been told she has "lots of gas" in her abdomen. Denies cp, n/v/d, sob, difficulty breathing 72 y/o f h/o htn, gerd, hld, hypothyroid p/w acute onset abdominal pain LUQ since "9:54pm." Pt was in the middle of having dinner and began having severe pain. Admits to 6 mos of abdominal pain and has been told she has "lots of gas" in her abdomen. Denies cp, n/v/d, sob, difficulty breathing. Has been having flatus and nl bm- 4x today.

## 2021-11-02 NOTE — ED PROVIDER NOTE - NS ED ROS FT
Constitutional:  See HPI  Eyes:  No visual changes  ENMT: No neck pain or stiffness  Cardiac:  No chest pain  Respiratory:  No cough or respiratory distress.   GI: + abdominal pain.  :  No dysuria, frequency or burning.  MS:  No back pain.  Neuro:  No headache   Skin:  No skin rash  Except as documented in the HPI,  all other systems are negative

## 2021-11-02 NOTE — H&P ADULT - HISTORY OF PRESENT ILLNESS
Pt is a 70 y/o F with a PMHx of HTN, HLD, DB, Hypothyroidism, glaucoma, s/p partial thyroidectomy presents to the ED with Epigastric abdominal pain that was worse after eating diner at 9:54pm yesterday. Pt's daughter states that the pt has been having some daily gas pain so she gave the pt Tums yesterday which helped the relieve the pain for 10mins. After 10mins the pain return worse. Pt denies burning sensation, N/V/D. States that she has been having BM and passing flatus regularly. PT dnies abdominal distention, CP, HA, SOB, Vision changes and dysuria. PT states that she last had a endoscopy in [place] recently in 2020 and her last colonoscopy was in 2015.     Surgery was consulted to r/o potential SBO seen on CT a/p. In the ED pt was VS, afebrile, physical exam showed epigastric and LUQ tenderness w/o rebound or guarding. Laboratory did not show an acute inflammatory process. CT a/p showed a dilated stomach, hiatal hernia, and dilated loops of small bowel with a transition point. NGT was place with some difficulty and 800cc of thick partially digested stomach contents was evacuated on insertion.

## 2021-11-02 NOTE — ED ADULT TRIAGE NOTE - CHIEF COMPLAINT QUOTE
Pt presents with complaints of abdominal pain worsening over the last hour. Denies any nausea/vomiting or diarrhea.

## 2021-11-03 LAB
ANION GAP SERPL CALC-SCNC: 13 MMOL/L — SIGNIFICANT CHANGE UP (ref 7–14)
BUN SERPL-MCNC: 9 MG/DL — LOW (ref 10–20)
CALCIUM SERPL-MCNC: 8.1 MG/DL — LOW (ref 8.5–10.1)
CHLORIDE SERPL-SCNC: 104 MMOL/L — SIGNIFICANT CHANGE UP (ref 98–110)
CO2 SERPL-SCNC: 26 MMOL/L — SIGNIFICANT CHANGE UP (ref 17–32)
CREAT SERPL-MCNC: 0.7 MG/DL — SIGNIFICANT CHANGE UP (ref 0.7–1.5)
GLUCOSE BLDC GLUCOMTR-MCNC: 104 MG/DL — HIGH (ref 70–99)
GLUCOSE BLDC GLUCOMTR-MCNC: 107 MG/DL — HIGH (ref 70–99)
GLUCOSE SERPL-MCNC: 126 MG/DL — HIGH (ref 70–99)
HCV AB S/CO SERPL IA: 0.03 COI — SIGNIFICANT CHANGE UP
HCV AB SERPL-IMP: SIGNIFICANT CHANGE UP
LACTATE SERPL-SCNC: 2.7 MMOL/L — HIGH (ref 0.7–2)
POTASSIUM SERPL-MCNC: 4.3 MMOL/L — SIGNIFICANT CHANGE UP (ref 3.5–5)
POTASSIUM SERPL-SCNC: 4.3 MMOL/L — SIGNIFICANT CHANGE UP (ref 3.5–5)
SODIUM SERPL-SCNC: 143 MMOL/L — SIGNIFICANT CHANGE UP (ref 135–146)

## 2021-11-03 PROCEDURE — 99024 POSTOP FOLLOW-UP VISIT: CPT

## 2021-11-03 PROCEDURE — 93010 ELECTROCARDIOGRAM REPORT: CPT

## 2021-11-03 RX ORDER — MAGNESIUM SULFATE 500 MG/ML
2 VIAL (ML) INJECTION ONCE
Refills: 0 | Status: COMPLETED | OUTPATIENT
Start: 2021-11-03 | End: 2021-11-03

## 2021-11-03 RX ORDER — DEXTROSE 50 % IN WATER 50 %
25 SYRINGE (ML) INTRAVENOUS ONCE
Refills: 0 | Status: DISCONTINUED | OUTPATIENT
Start: 2021-11-03 | End: 2021-11-05

## 2021-11-03 RX ORDER — SODIUM CHLORIDE 9 MG/ML
1000 INJECTION, SOLUTION INTRAVENOUS
Refills: 0 | Status: DISCONTINUED | OUTPATIENT
Start: 2021-11-03 | End: 2021-11-05

## 2021-11-03 RX ORDER — INSULIN LISPRO 100/ML
VIAL (ML) SUBCUTANEOUS
Refills: 0 | Status: DISCONTINUED | OUTPATIENT
Start: 2021-11-03 | End: 2021-11-05

## 2021-11-03 RX ORDER — SODIUM CHLORIDE 9 MG/ML
500 INJECTION INTRAMUSCULAR; INTRAVENOUS; SUBCUTANEOUS ONCE
Refills: 0 | Status: COMPLETED | OUTPATIENT
Start: 2021-11-03 | End: 2021-11-03

## 2021-11-03 RX ORDER — SODIUM CHLORIDE 9 MG/ML
500 INJECTION, SOLUTION INTRAVENOUS ONCE
Refills: 0 | Status: COMPLETED | OUTPATIENT
Start: 2021-11-03 | End: 2021-11-03

## 2021-11-03 RX ORDER — DEXTROSE 50 % IN WATER 50 %
15 SYRINGE (ML) INTRAVENOUS ONCE
Refills: 0 | Status: DISCONTINUED | OUTPATIENT
Start: 2021-11-03 | End: 2021-11-05

## 2021-11-03 RX ORDER — LEVOTHYROXINE SODIUM 125 MCG
75 TABLET ORAL AT BEDTIME
Refills: 0 | Status: DISCONTINUED | OUTPATIENT
Start: 2021-11-03 | End: 2021-11-05

## 2021-11-03 RX ORDER — DEXTROSE 50 % IN WATER 50 %
12.5 SYRINGE (ML) INTRAVENOUS ONCE
Refills: 0 | Status: DISCONTINUED | OUTPATIENT
Start: 2021-11-03 | End: 2021-11-05

## 2021-11-03 RX ORDER — GLUCAGON INJECTION, SOLUTION 0.5 MG/.1ML
1 INJECTION, SOLUTION SUBCUTANEOUS ONCE
Refills: 0 | Status: DISCONTINUED | OUTPATIENT
Start: 2021-11-03 | End: 2021-11-05

## 2021-11-03 RX ADMIN — Medication 50 GRAM(S): at 02:40

## 2021-11-03 RX ADMIN — Medication 75 MICROGRAM(S): at 21:48

## 2021-11-03 RX ADMIN — SODIUM CHLORIDE 100 MILLILITER(S): 9 INJECTION, SOLUTION INTRAVENOUS at 08:23

## 2021-11-03 RX ADMIN — Medication 15 MILLIGRAM(S): at 09:00

## 2021-11-03 RX ADMIN — ENOXAPARIN SODIUM 40 MILLIGRAM(S): 100 INJECTION SUBCUTANEOUS at 12:59

## 2021-11-03 RX ADMIN — Medication 100 MILLIGRAM(S): at 13:00

## 2021-11-03 RX ADMIN — Medication 100 MILLIGRAM(S): at 21:49

## 2021-11-03 RX ADMIN — Medication 15 MILLIGRAM(S): at 08:26

## 2021-11-03 RX ADMIN — SODIUM CHLORIDE 500 MILLILITER(S): 9 INJECTION, SOLUTION INTRAVENOUS at 18:57

## 2021-11-03 RX ADMIN — Medication 100 MILLIGRAM(S): at 05:58

## 2021-11-03 RX ADMIN — Medication 200 MILLIGRAM(S): at 19:21

## 2021-11-03 RX ADMIN — ATORVASTATIN CALCIUM 80 MILLIGRAM(S): 80 TABLET, FILM COATED ORAL at 21:48

## 2021-11-03 RX ADMIN — Medication 100 MICROGRAM(S): at 05:51

## 2021-11-03 RX ADMIN — AMLODIPINE BESYLATE 5 MILLIGRAM(S): 2.5 TABLET ORAL at 05:52

## 2021-11-03 RX ADMIN — Medication 200 MILLIGRAM(S): at 05:52

## 2021-11-03 RX ADMIN — PANTOPRAZOLE SODIUM 40 MILLIGRAM(S): 20 TABLET, DELAYED RELEASE ORAL at 13:11

## 2021-11-03 RX ADMIN — SODIUM CHLORIDE 500 MILLILITER(S): 9 INJECTION INTRAMUSCULAR; INTRAVENOUS; SUBCUTANEOUS at 02:42

## 2021-11-04 LAB
ANION GAP SERPL CALC-SCNC: 10 MMOL/L — SIGNIFICANT CHANGE UP (ref 7–14)
BASOPHILS # BLD AUTO: 0.03 K/UL — SIGNIFICANT CHANGE UP (ref 0–0.2)
BASOPHILS # BLD AUTO: 0.05 K/UL — SIGNIFICANT CHANGE UP (ref 0–0.2)
BASOPHILS NFR BLD AUTO: 0.4 % — SIGNIFICANT CHANGE UP (ref 0–1)
BASOPHILS NFR BLD AUTO: 0.7 % — SIGNIFICANT CHANGE UP (ref 0–1)
BUN SERPL-MCNC: 5 MG/DL — LOW (ref 10–20)
CALCIUM SERPL-MCNC: 8.2 MG/DL — LOW (ref 8.5–10.1)
CHLORIDE SERPL-SCNC: 105 MMOL/L — SIGNIFICANT CHANGE UP (ref 98–110)
CO2 SERPL-SCNC: 28 MMOL/L — SIGNIFICANT CHANGE UP (ref 17–32)
COVID-19 NUCLEOCAPSID GAM AB INTERP: NEGATIVE — SIGNIFICANT CHANGE UP
COVID-19 NUCLEOCAPSID TOTAL GAM ANTIBODY RESULT: 0.08 INDEX — SIGNIFICANT CHANGE UP
COVID-19 SPIKE DOMAIN AB INTERP: POSITIVE
COVID-19 SPIKE DOMAIN ANTIBODY RESULT: 124 U/ML — HIGH
CREAT SERPL-MCNC: 0.7 MG/DL — SIGNIFICANT CHANGE UP (ref 0.7–1.5)
EOSINOPHIL # BLD AUTO: 0.36 K/UL — SIGNIFICANT CHANGE UP (ref 0–0.7)
EOSINOPHIL # BLD AUTO: 0.47 K/UL — SIGNIFICANT CHANGE UP (ref 0–0.7)
EOSINOPHIL NFR BLD AUTO: 5.1 % — SIGNIFICANT CHANGE UP (ref 0–8)
EOSINOPHIL NFR BLD AUTO: 6.9 % — SIGNIFICANT CHANGE UP (ref 0–8)
GLUCOSE BLDC GLUCOMTR-MCNC: 119 MG/DL — HIGH (ref 70–99)
GLUCOSE BLDC GLUCOMTR-MCNC: 128 MG/DL — HIGH (ref 70–99)
GLUCOSE BLDC GLUCOMTR-MCNC: 131 MG/DL — HIGH (ref 70–99)
GLUCOSE BLDC GLUCOMTR-MCNC: 147 MG/DL — HIGH (ref 70–99)
GLUCOSE SERPL-MCNC: 122 MG/DL — HIGH (ref 70–99)
HCT VFR BLD CALC: 31.2 % — LOW (ref 37–47)
HCT VFR BLD CALC: 31.7 % — LOW (ref 37–47)
HGB BLD-MCNC: 10.2 G/DL — LOW (ref 12–16)
HGB BLD-MCNC: 10.2 G/DL — LOW (ref 12–16)
IMM GRANULOCYTES NFR BLD AUTO: 0.4 % — HIGH (ref 0.1–0.3)
IMM GRANULOCYTES NFR BLD AUTO: 0.7 % — HIGH (ref 0.1–0.3)
LACTATE SERPL-SCNC: 1.4 MMOL/L — SIGNIFICANT CHANGE UP (ref 0.7–2)
LYMPHOCYTES # BLD AUTO: 0.9 K/UL — LOW (ref 1.2–3.4)
LYMPHOCYTES # BLD AUTO: 1.39 K/UL — SIGNIFICANT CHANGE UP (ref 1.2–3.4)
LYMPHOCYTES # BLD AUTO: 12.6 % — LOW (ref 20.5–51.1)
LYMPHOCYTES # BLD AUTO: 20.4 % — LOW (ref 20.5–51.1)
MAGNESIUM SERPL-MCNC: 1.7 MG/DL — LOW (ref 1.8–2.4)
MCHC RBC-ENTMCNC: 29.5 PG — SIGNIFICANT CHANGE UP (ref 27–31)
MCHC RBC-ENTMCNC: 30.1 PG — SIGNIFICANT CHANGE UP (ref 27–31)
MCHC RBC-ENTMCNC: 32.2 G/DL — SIGNIFICANT CHANGE UP (ref 32–37)
MCHC RBC-ENTMCNC: 32.7 G/DL — SIGNIFICANT CHANGE UP (ref 32–37)
MCV RBC AUTO: 91.6 FL — SIGNIFICANT CHANGE UP (ref 81–99)
MCV RBC AUTO: 92 FL — SIGNIFICANT CHANGE UP (ref 81–99)
MONOCYTES # BLD AUTO: 0.63 K/UL — HIGH (ref 0.1–0.6)
MONOCYTES # BLD AUTO: 0.63 K/UL — HIGH (ref 0.1–0.6)
MONOCYTES NFR BLD AUTO: 8.8 % — SIGNIFICANT CHANGE UP (ref 1.7–9.3)
MONOCYTES NFR BLD AUTO: 9.2 % — SIGNIFICANT CHANGE UP (ref 1.7–9.3)
NEUTROPHILS # BLD AUTO: 4.26 K/UL — SIGNIFICANT CHANGE UP (ref 1.4–6.5)
NEUTROPHILS # BLD AUTO: 5.15 K/UL — SIGNIFICANT CHANGE UP (ref 1.4–6.5)
NEUTROPHILS NFR BLD AUTO: 62.4 % — SIGNIFICANT CHANGE UP (ref 42.2–75.2)
NEUTROPHILS NFR BLD AUTO: 72.4 % — SIGNIFICANT CHANGE UP (ref 42.2–75.2)
NRBC # BLD: 0 /100 WBCS — SIGNIFICANT CHANGE UP (ref 0–0)
NRBC # BLD: 0 /100 WBCS — SIGNIFICANT CHANGE UP (ref 0–0)
PHOSPHATE SERPL-MCNC: 3.1 MG/DL — SIGNIFICANT CHANGE UP (ref 2.1–4.9)
PLATELET # BLD AUTO: 150 K/UL — SIGNIFICANT CHANGE UP (ref 130–400)
PLATELET # BLD AUTO: 156 K/UL — SIGNIFICANT CHANGE UP (ref 130–400)
POTASSIUM SERPL-MCNC: 4.2 MMOL/L — SIGNIFICANT CHANGE UP (ref 3.5–5)
POTASSIUM SERPL-SCNC: 4.2 MMOL/L — SIGNIFICANT CHANGE UP (ref 3.5–5)
RBC # BLD: 3.39 M/UL — LOW (ref 4.2–5.4)
RBC # BLD: 3.46 M/UL — LOW (ref 4.2–5.4)
RBC # FLD: 14 % — SIGNIFICANT CHANGE UP (ref 11.5–14.5)
RBC # FLD: 14.4 % — SIGNIFICANT CHANGE UP (ref 11.5–14.5)
SARS-COV-2 IGG+IGM SERPL QL IA: 0.08 INDEX — SIGNIFICANT CHANGE UP
SARS-COV-2 IGG+IGM SERPL QL IA: 124 U/ML — HIGH
SARS-COV-2 IGG+IGM SERPL QL IA: NEGATIVE — SIGNIFICANT CHANGE UP
SARS-COV-2 IGG+IGM SERPL QL IA: POSITIVE
SODIUM SERPL-SCNC: 143 MMOL/L — SIGNIFICANT CHANGE UP (ref 135–146)
WBC # BLD: 6.83 K/UL — SIGNIFICANT CHANGE UP (ref 4.8–10.8)
WBC # BLD: 7.12 K/UL — SIGNIFICANT CHANGE UP (ref 4.8–10.8)
WBC # FLD AUTO: 6.83 K/UL — SIGNIFICANT CHANGE UP (ref 4.8–10.8)
WBC # FLD AUTO: 7.12 K/UL — SIGNIFICANT CHANGE UP (ref 4.8–10.8)

## 2021-11-04 PROCEDURE — 99024 POSTOP FOLLOW-UP VISIT: CPT

## 2021-11-04 RX ORDER — MAGNESIUM SULFATE 500 MG/ML
2 VIAL (ML) INJECTION ONCE
Refills: 0 | Status: COMPLETED | OUTPATIENT
Start: 2021-11-04 | End: 2021-11-04

## 2021-11-04 RX ADMIN — ATORVASTATIN CALCIUM 80 MILLIGRAM(S): 80 TABLET, FILM COATED ORAL at 21:42

## 2021-11-04 RX ADMIN — AMLODIPINE BESYLATE 5 MILLIGRAM(S): 2.5 TABLET ORAL at 06:33

## 2021-11-04 RX ADMIN — Medication 200 MILLIGRAM(S): at 06:33

## 2021-11-04 RX ADMIN — ENOXAPARIN SODIUM 40 MILLIGRAM(S): 100 INJECTION SUBCUTANEOUS at 12:11

## 2021-11-04 RX ADMIN — Medication 50 GRAM(S): at 15:47

## 2021-11-04 RX ADMIN — SODIUM CHLORIDE 100 MILLILITER(S): 9 INJECTION, SOLUTION INTRAVENOUS at 10:12

## 2021-11-04 RX ADMIN — Medication 100 MILLIGRAM(S): at 06:33

## 2021-11-04 RX ADMIN — Medication 75 MICROGRAM(S): at 21:42

## 2021-11-04 RX ADMIN — PANTOPRAZOLE SODIUM 40 MILLIGRAM(S): 20 TABLET, DELAYED RELEASE ORAL at 12:11

## 2021-11-05 ENCOUNTER — TRANSCRIPTION ENCOUNTER (OUTPATIENT)
Age: 71
End: 2021-11-05

## 2021-11-05 VITALS
HEART RATE: 54 BPM | DIASTOLIC BLOOD PRESSURE: 70 MMHG | TEMPERATURE: 97 F | SYSTOLIC BLOOD PRESSURE: 143 MMHG | RESPIRATION RATE: 18 BRPM

## 2021-11-05 LAB
ANION GAP SERPL CALC-SCNC: 8 MMOL/L — SIGNIFICANT CHANGE UP (ref 7–14)
BUN SERPL-MCNC: 4 MG/DL — LOW (ref 10–20)
CALCIUM SERPL-MCNC: 8.2 MG/DL — LOW (ref 8.5–10.1)
CHLORIDE SERPL-SCNC: 105 MMOL/L — SIGNIFICANT CHANGE UP (ref 98–110)
CO2 SERPL-SCNC: 29 MMOL/L — SIGNIFICANT CHANGE UP (ref 17–32)
CREAT SERPL-MCNC: 0.7 MG/DL — SIGNIFICANT CHANGE UP (ref 0.7–1.5)
GLUCOSE BLDC GLUCOMTR-MCNC: 104 MG/DL — HIGH (ref 70–99)
GLUCOSE BLDC GLUCOMTR-MCNC: 111 MG/DL — HIGH (ref 70–99)
GLUCOSE SERPL-MCNC: 115 MG/DL — HIGH (ref 70–99)
MAGNESIUM SERPL-MCNC: 2 MG/DL — SIGNIFICANT CHANGE UP (ref 1.8–2.4)
PHOSPHATE SERPL-MCNC: 3.1 MG/DL — SIGNIFICANT CHANGE UP (ref 2.1–4.9)
POTASSIUM SERPL-MCNC: 3.7 MMOL/L — SIGNIFICANT CHANGE UP (ref 3.5–5)
POTASSIUM SERPL-SCNC: 3.7 MMOL/L — SIGNIFICANT CHANGE UP (ref 3.5–5)
SODIUM SERPL-SCNC: 142 MMOL/L — SIGNIFICANT CHANGE UP (ref 135–146)

## 2021-11-05 PROCEDURE — 99238 HOSP IP/OBS DSCHRG MGMT 30/<: CPT

## 2021-11-05 RX ORDER — SODIUM CHLORIDE 9 MG/ML
1000 INJECTION, SOLUTION INTRAVENOUS
Refills: 0 | Status: DISCONTINUED | OUTPATIENT
Start: 2021-11-05 | End: 2021-11-05

## 2021-11-05 RX ORDER — POTASSIUM CHLORIDE 20 MEQ
20 PACKET (EA) ORAL ONCE
Refills: 0 | Status: COMPLETED | OUTPATIENT
Start: 2021-11-05 | End: 2021-11-05

## 2021-11-05 RX ADMIN — AMLODIPINE BESYLATE 5 MILLIGRAM(S): 2.5 TABLET ORAL at 05:49

## 2021-11-05 RX ADMIN — Medication 50 MILLIEQUIVALENT(S): at 05:49

## 2021-11-05 RX ADMIN — SODIUM CHLORIDE 75 MILLILITER(S): 9 INJECTION, SOLUTION INTRAVENOUS at 10:11

## 2021-11-05 RX ADMIN — ENOXAPARIN SODIUM 40 MILLIGRAM(S): 100 INJECTION SUBCUTANEOUS at 11:47

## 2021-11-05 RX ADMIN — PANTOPRAZOLE SODIUM 40 MILLIGRAM(S): 20 TABLET, DELAYED RELEASE ORAL at 11:46

## 2021-11-05 NOTE — DISCHARGE NOTE PROVIDER - NSDCCPTREATMENT_GEN_ALL_CORE_FT
PRINCIPAL PROCEDURE  Procedure: Diagnostic laparoscopy  Findings and Treatment:       SECONDARY PROCEDURE  Procedure: Block, transversus abdominis plane, bilateral  Findings and Treatment:

## 2021-11-05 NOTE — DISCHARGE NOTE PROVIDER - HOSPITAL COURSE
This is a 72 y/o F with a PMHx of HTN, HLD, DB, Hypothyroidism, glaucoma, s/p partial thyroidectomy presents to the ED c/o abdominal pain that was worse after eating diner .  CT a/p showed a dilated stomach, hiatal hernia, and dilated loops of small bowel with a transition point. NGT was placed and 800cc of thick partially digested stomach contents was evacuated on insertion. pt admitted to surgery service and underwent diagnostic laparoscopy on 11/2/2021 for small bowel obstruction.  Post operatively pt treated medically and diet advanced as tolerated.  On 11/5/2021 pt without complaints. tolerated po diet, voided and ambulated. vital signs stable and afebrile. labs reviewed. pt doing well and discharged home in   stable condition .Pt advised follow up with Dr Mehta in 1 week for wound check. Resume home medications. and precaution provided     This is a 70 y/o F with a PMHx of HTN, HLD, DB, Hypothyroidism, glaucoma, s/p partial thyroidectomy presents to the ED c/o abdominal pain that was worse after eating dinner .  CT a/p showed a dilated stomach, hiatal hernia, and dilated loops of small bowel with a transition point. NGT was placed and 800cc of thick partially digested stomach contents was evacuated on insertion. pt admitted to surgery service and underwent diagnostic laparoscopy on 11/2/2021 for small bowel obstruction.  Post operatively pt treated medically and diet advanced as tolerated.  On 11/5/2021 pt without complaints. tolerated po diet, voided and ambulated. vital signs stable and afebrile. labs reviewed. pt doing well and discharged home in   stable condition .Pt advised follow up with Dr Mehta in 1 week for wound check. Resume home medications. and instructions provided

## 2021-11-05 NOTE — CHART NOTE - NSCHARTNOTEFT_GEN_A_CORE
PACU ANESTHESIA ADMISSION NOTE      Procedure: Diagnostic laparoscopy    Block, transversus abdominis plane, bilateral      Post op diagnosis:  Small bowel obstruction        ____  Intubated  TV:______       Rate: ______      FiO2: ______    __x__  Patent Airway    __x__  Full return of protective reflexes    __x__  Full recovery from anesthesia / back to baseline status    Vitals:  T(C): 36.8 (11-02-21 @ 12:27), Max: 36.8 (11-02-21 @ 12:00)  HR: 65 (11-02-21 @ 12:27) (56 - 75)  BP: 131/71 (11-02-21 @ 12:27) (113/75 - 141/78)  RR: 16 (11-02-21 @ 09:00) (16 - 18)  SpO2: 100% (11-02-21 @ 12:27) (99% - 100%)    Mental Status:  __x__ Awake   ___x__ Alert   _____ Drowsy   _____ Sedated    Nausea/Vomiting:  __x__ NO  ______Yes,   See Post - Op Orders          Pain Scale (0-10):  _____    Treatment: ____ None    __x__ See Post - Op/PCA Orders    Post - Operative Fluids:   ____ Oral   __x__ See Post - Op Orders    Plan: Discharge:   ____Home       _____Floor     _____Critical Care    _____  Other:_________________    Comments: Patient had smooth intraoperative event, no anesthesia complication.  PACU Vital signs: HR:     95       BP:   198     /    100      RR: 22            O2 Sat:   100    %     Temp 97.9    antihypertensive ordered
Post Operative Note  Patient: PRISCILLA PINEDA 71y (1950) Female   MRN: [id number]  Location: [address]  Visit: 11-02-21 Inpatient  Date: 11-02-21 @ 17:53    Procedure: Small bowel obstruction S/P Diagnostic laparoscopy, bilateral tap block    Subjective: Patient reports feeling well, denies pain.  Nausea: no, Vomiting: no, Ambulating: yes, Flatus: no  Pain Assessment: Patient is denies any pain.    Objective:  Vitals: T(F): 98.8 (11-02-21 @ 17:17), Max: 98.8 (11-02-21 @ 17:17)  HR: 63 (11-02-21 @ 17:17)  BP: 107/59 (11-02-21 @ 17:17) (107/59 - 198/110)  RR: 18 (11-02-21 @ 17:17)  SpO2: 100% (11-02-21 @ 17:17)    In:   11-02-21 @ 07:01  -  11-02-21 @ 17:53  --------------------------------------------------------  IN: 500 mL    IV Fluids: lactated ringers. 1000 milliLiter(s) (100 mL/Hr) IV Continuous <Continuous>    Out:   11-02-21 @ 07:01  -  11-02-21 @ 17:53  --------------------------------------------------------  OUT: 850 mL    EBL: 3mL    Voided Urine:   11-02-21 @ 07:01  -  11-02-21 @ 17:53  --------------------------------------------------------  OUT: 850 mL    NG Tube:   11-02-21 @ 07:01  -  11-02-21 @ 17:53  --------------------------------------------------------  OUT: 850 mL    Physical Examination:  General Appearance: NAD, AAOx3, calm and cooperative  Heart: RRR  Lungs: CTABL  Abdomen:  Soft, nondistended, nontender to palpation. No rigidity, guarding, or rebound tenderness.   MSK/Extremities: Warm & well-perfused. Peripheral pulses intact.  Skin: Warm, dry. No jaundice.   Incisions/Wounds: Dressings in place, clean, dry and intact, no signs of infection/active bleeding/drainage    Medications: [Standing]  amLODIPine   Tablet 5 milliGRAM(s) Oral daily  atorvastatin 80 milliGRAM(s) Oral at bedtime  chlorhexidine 4% Liquid 1 Application(s) Topical daily  ciprofloxacin   IVPB      enoxaparin Injectable 40 milliGRAM(s) SubCutaneous daily  ketorolac   Injectable 15 milliGRAM(s) IV Push every 6 hours PRN  lactated ringers. 1000 milliLiter(s) IV Continuous <Continuous>  levothyroxine  Oral Tab/Cap - Peds 100 MICROGram(s) Oral daily  metroNIDAZOLE  IVPB 500 milliGRAM(s) IV Intermittent every 8 hours  metroNIDAZOLE  IVPB      pantoprazole  Injectable 40 milliGRAM(s) IV Push daily    Medications: [PRN]  amLODIPine   Tablet 5 milliGRAM(s) Oral daily  atorvastatin 80 milliGRAM(s) Oral at bedtime  chlorhexidine 4% Liquid 1 Application(s) Topical daily  ciprofloxacin   IVPB      enoxaparin Injectable 40 milliGRAM(s) SubCutaneous daily  ketorolac   Injectable 15 milliGRAM(s) IV Push every 6 hours PRN  lactated ringers. 1000 milliLiter(s) IV Continuous <Continuous>  levothyroxine  Oral Tab/Cap - Peds 100 MICROGram(s) Oral daily  metroNIDAZOLE  IVPB 500 milliGRAM(s) IV Intermittent every 8 hours  metroNIDAZOLE  IVPB      pantoprazole  Injectable 40 milliGRAM(s) IV Push daily    DVT PROPHYLAXIS: enoxaparin Injectable 40 milliGRAM(s) SubCutaneous daily  GI PROPHYLAXIS: pantoprazole  Injectable 40 milliGRAM(s) IV Push daily    ANTICOAGULATION:   ANTIBIOTICS:  ciprofloxacin   IVPB    metroNIDAZOLE  IVPB 500 milliGRAM(s)  metroNIDAZOLE  IVPB      Labs:                   12.8   8.48  )-----------( 207      ( 02 Nov 2021 01:19 )             38.6     11-02  142  |  100  |  20  ----------------------------<  142<H>  4.6   |  27  |  1.0    Ca    9.3      02 Nov 2021 01:19    TPro  7.1  /  Alb  4.7  /  TBili  0.3  /  DBili  <0.2  /  AST  48<H>  /  ALT  16  /  AlkPhos  69  11-02    PT/INR - ( 02 Nov 2021 08:31 )   PT: 11.70 sec;   INR: 1.02 ratio    PTT - ( 02 Nov 2021 08:31 )  PTT:31.7 sec    CARDIAC MARKERS ( 02 Nov 2021 01:19 )  x     / <0.01 ng/mL / x     / x     / x        Imaging:  No post-op imaging studies    Assessment:  71yF s/p diagnostic laparoscopy, bilateral tap block.    Plan:  -NPO, IVFs  -Restart home meds  -F/u TOV  -F/u pm labs  -Pain control prn  -Monitor vitals  -OOBAT  -IS  -DVT/GI ppx      Date/Time: 11-02-21 @ 17:53
pt without complaints, tolerated po diet, voided and ambulated. vital signs stable and afebrile. Pt doing well and wants to go home now. As per Dr Gustafson pt can be discharged home today. pt advised to follow up with Dr Mehta in 1 week for wound check. Resume home medications and precaution provided.  all questions answered @ this time.

## 2021-11-05 NOTE — DISCHARGE NOTE NURSING/CASE MANAGEMENT/SOCIAL WORK - PATIENT PORTAL LINK FT
You can access the FollowMyHealth Patient Portal offered by [facility] by registering at the following website: [email or web address]. By joining [facility]’s FollowMyHealth portal, you will also be able to view your health information using other applications (apps) compatible with our system.

## 2021-11-05 NOTE — DISCHARGE NOTE PROVIDER - NSDCMRMEDTOKEN_GEN_ALL_CORE_FT
amLODIPine 5 mg oral tablet: 1 tab(s) orally once a day  Crestor 20 mg oral tablet: 1 tab(s) orally once a day  levothyroxine 100 mcg (0.1 mg) oral capsule: 1 cap(s) orally once a day (Except on sundays, take 1/2 tab)  metFORMIN 500 mg oral tablet, extended release: 1 tab(s) orally 2 times a day  omeprazole 20 mg oral delayed release tablet: 1 tab(s) orally every 12 hours  temazepam 15 mg oral capsule: 1 cap(s) orally once a day (at bedtime), As Needed

## 2021-11-05 NOTE — PROGRESS NOTE ADULT - SUBJECTIVE AND OBJECTIVE BOX
GENERAL SURGERY PROGRESS NOTE  Pain controlled, +flatus, -BM, +OOB and ambulating, +Void. Started on CLD tolerating.      Events of past 24 hours:     Vitals:   T(F): 98.9 (11-03-21 @ 20:36), Max: 98.9 (11-03-21 @ 16:35)  HR: 59 (11-03-21 @ 20:36)  BP: 129/61 (11-03-21 @ 20:36)  RR: 18 (11-03-21 @ 20:36)  SpO2: 97% (11-03-21 @ 20:36)      Diet, NPO:   Except Medications  With Ice Chips/Sips of Water      Fluids: dextrose 5% + sodium chloride 0.45%.: Solution, 1000 milliLiter(s) infuse at 100 mL/Hr  Provider's Contact #: [phone]      I & O's:    11-02-21 @ 07:01  -  11-03-21 @ 07:00  --------------------------------------------------------  IN:    IV PiggyBack: 200 mL    Lactated Ringers: 1700 mL  Total IN: 1900 mL    OUT:    Nasogastric/Oral tube (mL): 850 mL    Voided (mL): 400 mL  Total OUT: 1250 mL    Total NET: 650 mL      PHYSICAL EXAM:  General Appearance: NAD  Heart: RRR   Lungs: No increased work of breathing or accessory muscle use.  Abdomen:  Soft, nontender, nondistended.   MSK/Extremities: Warm & well-perfused.   Skin: Warm, dry. No jaundice.   Incision/wound: healing well, dressings in place, clean, dry and intact    MEDICATIONS  (STANDING):  amLODIPine   Tablet 5 milliGRAM(s) Oral daily  atorvastatin 80 milliGRAM(s) Oral at bedtime  chlorhexidine 4% Liquid 1 Application(s) Topical daily  ciprofloxacin   IVPB      ciprofloxacin   IVPB 400 milliGRAM(s) IV Intermittent every 12 hours  dextrose 40% Gel 15 Gram(s) Oral once  dextrose 5% + sodium chloride 0.45%. 1000 milliLiter(s) (100 mL/Hr) IV Continuous <Continuous>  dextrose 5%. 1000 milliLiter(s) (50 mL/Hr) IV Continuous <Continuous>  dextrose 5%. 1000 milliLiter(s) (100 mL/Hr) IV Continuous <Continuous>  dextrose 50% Injectable 25 Gram(s) IV Push once  dextrose 50% Injectable 12.5 Gram(s) IV Push once  dextrose 50% Injectable 25 Gram(s) IV Push once  enoxaparin Injectable 40 milliGRAM(s) SubCutaneous daily  glucagon  Injectable 1 milliGRAM(s) IntraMuscular once  insulin lispro (ADMELOG) corrective regimen sliding scale   SubCutaneous Before meals and at bedtime  levothyroxine Injectable 75 MICROGram(s) IV Push at bedtime  metroNIDAZOLE  IVPB 500 milliGRAM(s) IV Intermittent every 8 hours  metroNIDAZOLE  IVPB      pantoprazole  Injectable 40 milliGRAM(s) IV Push daily    MEDICATIONS  (PRN):  ketorolac   Injectable 15 milliGRAM(s) IV Push every 6 hours PRN Moderate Pain (4 - 6)      DVT PROPHYLAXIS: enoxaparin Injectable 40 milliGRAM(s) SubCutaneous daily    GI PROPHYLAXIS: pantoprazole  Injectable 40 milliGRAM(s) IV Push daily    ANTICOAGULATION:   ANTIBIOTICS:  ciprofloxacin   IVPB    ciprofloxacin   IVPB 400 milliGRAM(s)  metroNIDAZOLE  IVPB 500 milliGRAM(s)  metroNIDAZOLE  IVPB              LAB/STUDIES:  Labs:  CAPILLARY BLOOD GLUCOSE      POCT Blood Glucose.: 104 mg/dL (03 Nov 2021 21:46)  POCT Blood Glucose.: 107 mg/dL (03 Nov 2021 16:02)                          11.1   7.80  )-----------( 158      ( 02 Nov 2021 20:00 )             33.8         11-03    143  |  104  |  9<L>  ----------------------------<  126<H>  4.3   |  26  |  0.7      Calcium, Total Serum: 8.1 mg/dL (11-03-21 @ 13:00)      LFTs:     Lactate, Blood: 2.7 mmol/L (11-03-21 @ 07:33)  Lactate, Blood: 3.1 mmol/L (11-02-21 @ 20:00)  Lactate, Blood: 1.7 mmol/L (11-02-21 @ 01:19)      Coags:     11.70  ----< 1.02    ( 02 Nov 2021 08:31 )     31.7                    Culture - Body Fluid with Gram Stain (collected 02 Nov 2021 19:27)  Source: .Body Fluid None  Gram Stain (03 Nov 2021 02:28):    polymorphonuclear leukocytes seen    No organisms seen    by cytocentrifuge  Preliminary Report (03 Nov 2021 19:57):    No growth to date.    
GENERAL SURGERY PROGRESS NOTE    Patient: PRISCILLA PINEDA , 71y ([birth year])Female   MRN: [id number]  Location: [address]  Visit: 11-02-21 Inpatient  Date: 11-03-21 @ 08:53    Hospital Day #:2  Post-Op Day #:1    Procedure/Dx/Injuries: Diagnostic laparoscopy, Block, transversus abdominis plane, bilateral     Events of past 24 hours: s/p surgical intervention, TOV +    PAST MEDICAL & SURGICAL HISTORY:  Hypertension    Diabetes    Hypothyroid    Dyslipidemia    Glaucoma    S/P partial thyroidectomy        Vitals:   T(F): 98 (11-03-21 @ 04:39), Max: 98.8 (11-02-21 @ 17:17)  HR: 60 (11-03-21 @ 04:39)  BP: 108/56 (11-03-21 @ 04:39)  RR: 19 (11-03-21 @ 04:39)  SpO2: 98% (11-03-21 @ 04:39)      Diet, NPO      Fluids: dextrose 5% + sodium chloride 0.45%.: Solution, 1000 milliLiter(s) infuse at 100 mL/Hr  Provider's Contact #: [phone]      I & O's:    11-02-21 @ 07:01  -  11-03-21 @ 07:00  --------------------------------------------------------  IN:    IV PiggyBack: 200 mL    Lactated Ringers: 1700 mL  Total IN: 1900 mL    OUT:    Nasogastric/Oral tube (mL): 850 mL    Voided (mL): 400 mL  Total OUT: 1250 mL    Total NET: 650 mL        Bowel Movement: : [] YES [] NO  Flatus: : [] YES [] NO    PHYSICAL EXAM:  General: NAD, AAOx3, calm and cooperative  Cardiac: RRR S1, S2, no Murmurs, rubs or gallops  Respiratory: CTAB, normal respiratory effort,  Abdomen: Soft, non-distended, mild tender, no rebound, no guarding. +BS.  Skin: Warm/dry, normal color, no jaundice  Incision/wound: healing well, dressings in place, clean, dry and intact    MEDICATIONS  (STANDING):  amLODIPine   Tablet 5 milliGRAM(s) Oral daily  atorvastatin 80 milliGRAM(s) Oral at bedtime  chlorhexidine 4% Liquid 1 Application(s) Topical daily  ciprofloxacin   IVPB      ciprofloxacin   IVPB 400 milliGRAM(s) IV Intermittent every 12 hours  dextrose 5% + sodium chloride 0.45%. 1000 milliLiter(s) (100 mL/Hr) IV Continuous <Continuous>  enoxaparin Injectable 40 milliGRAM(s) SubCutaneous daily  levothyroxine  Oral Tab/Cap - Peds 100 MICROGram(s) Oral daily  metroNIDAZOLE  IVPB 500 milliGRAM(s) IV Intermittent every 8 hours  metroNIDAZOLE  IVPB      pantoprazole  Injectable 40 milliGRAM(s) IV Push daily    MEDICATIONS  (PRN):  ketorolac   Injectable 15 milliGRAM(s) IV Push every 6 hours PRN Moderate Pain (4 - 6)      DVT PROPHYLAXIS: enoxaparin Injectable 40 milliGRAM(s) SubCutaneous daily    GI PROPHYLAXIS: pantoprazole  Injectable 40 milliGRAM(s) IV Push daily    ANTICOAGULATION:   ANTIBIOTICS:  ciprofloxacin   IVPB    ciprofloxacin   IVPB 400 milliGRAM(s)  metroNIDAZOLE  IVPB 500 milliGRAM(s)  metroNIDAZOLE  IVPB              LAB/STUDIES:  Labs:  CAPILLARY BLOOD GLUCOSE      POCT Blood Glucose.: 176 mg/dL (02 Nov 2021 12:11)                          11.1   7.80  )-----------( 158      ( 02 Nov 2021 20:00 )             33.8       Auto Neutrophil %: 80.4 % (11-02-21 @ 20:00)  Auto Immature Granulocyte %: 0.4 % (11-02-21 @ 20:00)    11-02    140  |  102  |  12  ----------------------------<  137<H>  5.4<H>   |  23  |  0.7      Calcium, Total Serum: 8.5 mg/dL (11-02-21 @ 20:00)      LFTs:             7.1  | 0.3  | 48       ------------------[69      ( 02 Nov 2021 01:19 )  4.7  | <0.2 | 16          Lipase:45     Amylase:x         Lactate, Blood: 2.7 mmol/L (11-03-21 @ 07:33)  Lactate, Blood: 3.1 mmol/L (11-02-21 @ 20:00)  Lactate, Blood: 1.7 mmol/L (11-02-21 @ 01:19)      Coags:     11.70  ----< 1.02    ( 02 Nov 2021 08:31 )     31.7        CARDIAC MARKERS ( 02 Nov 2021 01:19 )  x     / <0.01 ng/mL / x     / x     / x                  Culture - Body Fluid with Gram Stain (collected 02 Nov 2021 19:27)  Source: .Body Fluid None  Gram Stain (03 Nov 2021 02:28):    polymorphonuclear leukocytes seen    No organisms seen    by cytocentrifuge              IMAGING:      ACCESS/ DEVICES:  [ ] Peripheral IV  [ ] Central Venous Line	[ ] R	[ ] L	[ ] IJ	[ ] Fem	[ ] SC	Placed:   [ ] Arterial Line		[ ] R	[ ] L	[ ] Fem	[ ] Rad	[ ] Ax	Placed:   [ ] PICC:					[ ] Mediport  [ ] Urinary Catheter,  Date Placed:   [ ] Chest tube: [ ] Right, [ ] Left  [ ] CHAN/Bong Drains          
GENERAL SURGERY PROGRESS NOTE  Pain controlled, +flatus, -BM, +OOB and ambulating, +Void. Continues to be NPO, restarted home meds.    Events of past 24 hours:     Vitals:   T(F): 98.9 (11-03-21 @ 20:36), Max: 98.9 (11-03-21 @ 16:35)  HR: 59 (11-03-21 @ 20:36)  BP: 129/61 (11-03-21 @ 20:36)  RR: 18 (11-03-21 @ 20:36)  SpO2: 97% (11-03-21 @ 20:36)      Diet, NPO:   Except Medications  With Ice Chips/Sips of Water      Fluids: dextrose 5% + sodium chloride 0.45%.: Solution, 1000 milliLiter(s) infuse at 100 mL/Hr  Provider's Contact #: [phone]      I & O's:    11-02-21 @ 07:01  -  11-03-21 @ 07:00  --------------------------------------------------------  IN:    IV PiggyBack: 200 mL    Lactated Ringers: 1700 mL  Total IN: 1900 mL    OUT:    Nasogastric/Oral tube (mL): 850 mL    Voided (mL): 400 mL  Total OUT: 1250 mL    Total NET: 650 mL      PHYSICAL EXAM:  General Appearance: NAD  HEENT: EOMI, sclera non-icteric.  Heart: RRR   Lungs: No increased work of breathing or accessory muscle use. Symmetric chest wall rise and fall.   Abdomen:  Soft, nontender, nondistended.   MSK/Extremities: Warm & well-perfused.   Skin: Warm, dry. No jaundice.   Incision/wound: healing well, dressings in place, clean, dry and intact    MEDICATIONS  (STANDING):  amLODIPine   Tablet 5 milliGRAM(s) Oral daily  atorvastatin 80 milliGRAM(s) Oral at bedtime  chlorhexidine 4% Liquid 1 Application(s) Topical daily  ciprofloxacin   IVPB      ciprofloxacin   IVPB 400 milliGRAM(s) IV Intermittent every 12 hours  dextrose 40% Gel 15 Gram(s) Oral once  dextrose 5% + sodium chloride 0.45%. 1000 milliLiter(s) (100 mL/Hr) IV Continuous <Continuous>  dextrose 5%. 1000 milliLiter(s) (50 mL/Hr) IV Continuous <Continuous>  dextrose 5%. 1000 milliLiter(s) (100 mL/Hr) IV Continuous <Continuous>  dextrose 50% Injectable 25 Gram(s) IV Push once  dextrose 50% Injectable 12.5 Gram(s) IV Push once  dextrose 50% Injectable 25 Gram(s) IV Push once  enoxaparin Injectable 40 milliGRAM(s) SubCutaneous daily  glucagon  Injectable 1 milliGRAM(s) IntraMuscular once  insulin lispro (ADMELOG) corrective regimen sliding scale   SubCutaneous Before meals and at bedtime  levothyroxine Injectable 75 MICROGram(s) IV Push at bedtime  metroNIDAZOLE  IVPB 500 milliGRAM(s) IV Intermittent every 8 hours  metroNIDAZOLE  IVPB      pantoprazole  Injectable 40 milliGRAM(s) IV Push daily    MEDICATIONS  (PRN):  ketorolac   Injectable 15 milliGRAM(s) IV Push every 6 hours PRN Moderate Pain (4 - 6)      DVT PROPHYLAXIS: enoxaparin Injectable 40 milliGRAM(s) SubCutaneous daily    GI PROPHYLAXIS: pantoprazole  Injectable 40 milliGRAM(s) IV Push daily    ANTICOAGULATION:   ANTIBIOTICS:  ciprofloxacin   IVPB    ciprofloxacin   IVPB 400 milliGRAM(s)  metroNIDAZOLE  IVPB 500 milliGRAM(s)  metroNIDAZOLE  IVPB              LAB/STUDIES:  Labs:  CAPILLARY BLOOD GLUCOSE      POCT Blood Glucose.: 104 mg/dL (03 Nov 2021 21:46)  POCT Blood Glucose.: 107 mg/dL (03 Nov 2021 16:02)                          11.1   7.80  )-----------( 158      ( 02 Nov 2021 20:00 )             33.8         11-03    143  |  104  |  9<L>  ----------------------------<  126<H>  4.3   |  26  |  0.7      Calcium, Total Serum: 8.1 mg/dL (11-03-21 @ 13:00)      LFTs:     Lactate, Blood: 2.7 mmol/L (11-03-21 @ 07:33)  Lactate, Blood: 3.1 mmol/L (11-02-21 @ 20:00)  Lactate, Blood: 1.7 mmol/L (11-02-21 @ 01:19)      Coags:     11.70  ----< 1.02    ( 02 Nov 2021 08:31 )     31.7                    Culture - Body Fluid with Gram Stain (collected 02 Nov 2021 19:27)  Source: .Body Fluid None  Gram Stain (03 Nov 2021 02:28):    polymorphonuclear leukocytes seen    No organisms seen    by cytocentrifuge  Preliminary Report (03 Nov 2021 19:57):    No growth to date.

## 2021-11-05 NOTE — CDI QUERY NOTE - NSCDIOTHERTXTBX_GEN_ALL_CORE_HH
Dr. Juan Diego Kaur,    70 y/o female admitted with SBO on 11/2/21    Laboratory findings are significant for the following:    Lactate, Blood: 2.7 mmol/L (11-03-21 @ 07:33)  Lactate, Blood: 3.1 mmol/L (11-02-21 @ 20:00)  Lactate, Blood: 1.7 mmol/L (11-02-21 @ 01:19)    Treatment:  IVF: LR Bolus X3 @ 100mL/Hr.    Based on the clinical indicators above and the treatment, can you  please indicate the diagnosis being treated?    -Lactic acidosis  -Lacticemia  -Other (Please specify)  -Clinically unable to determine    Thank you  Jessica Zamarripa  CDI Department

## 2021-11-05 NOTE — PROGRESS NOTE ADULT - ASSESSMENT
70 y/o F with a PMHx of HTN, HLD, DB, Hypothyroidism, glaucoma, s/p partial thyroidectomy presents to the ED with Epigastric abdominal pain that was worse after eating diner.     PLAN:  - NPO - strict  - DVT and GI ppx  - IV medication  - IVF's  - pain control  - monitor INs and OUTs    spectra [id number]  
ASSESSMENT:  72 y/o F with a PMHx of HTN, HLD, DB, Hypothyroidism, glaucoma, s/p partial thyroidectomy, now POD 2 s/p Dx laparoscopy w/ findings of closed loop SBO through loop of omentum, no SBR.    PLAN:  - NPO, possibly advance diet 11/4, will discuss with attending  - DVT and GI ppx  - Resumed home medications  - IVF's  - pain control  - monitor INs and OUTs      Hoang Jolley MD  General Surgery PGY-1  [place] TEAM SPECTRA [id number]
ASSESSMENT:  72 y/o F with a PMHx of HTN, HLD, DB, Hypothyroidism, glaucoma, s/p partial thyroidectomy, now POD 2 s/p Dx laparoscopy w/ findings of closed loop SBO through loop of omentum, no SBR. Pt progressed to CLD yesterday, tolerating, +BM, Voiding and +gas.     PLAN:  - Start soft mechanical diet  - DVT and GI ppx  - Resumed home medications  - IVF's  - pain control  - monitor INs and OUTs  - monitor BM    [place] TEAM SPECTRA [id number]

## 2021-11-05 NOTE — PROGRESS NOTE ADULT - ATTENDING COMMENTS
Patient seen and examined with surgery PA on rounds on floor and discussed management plans with patient. Patient ambulatory tolerating clear liquids and + BM and flatus. VS stable c/o some discomfort left flank area. All trocar wounds clean. some palpable s/c swelling left flank area should resolve spontaneously. Diet advanced . Patient may be discharged home if tolerating diet and fu as outpatient in one to 2 weeks. Patient instructed for followup diet and care.

## 2021-11-05 NOTE — DISCHARGE NOTE PROVIDER - CARE PROVIDER_API CALL
Bi Mehta)  Surgery  [address] [place] [address], [address] [place] [address]  Phone: [phone]  Fax: [phone]  Follow Up Time:

## 2021-11-05 NOTE — DISCHARGE NOTE PROVIDER - NSDCFUADDINST_GEN_ALL_CORE_FT
follow up with Dr Mehta in 1 week for wound check call office for appointment   follow up with your PMD    no strenuous activity  keep wound clean and dry  no tub bath  resume home medications    if experience fever, shortness of breath, chest pain, dizziness, vomiting, bleeding or drainage from wound call MD or return to ED

## 2021-11-08 ENCOUNTER — TRANSCRIPTION ENCOUNTER (OUTPATIENT)
Age: 71
End: 2021-11-08

## 2021-11-10 DIAGNOSIS — Z87.19 PERSONAL HISTORY OF OTHER DISEASES OF THE DIGESTIVE SYSTEM: ICD-10-CM

## 2021-11-17 ENCOUNTER — APPOINTMENT (OUTPATIENT)
Dept: SURGERY | Facility: CLINIC | Age: 71
End: 2021-11-17
Payer: MEDICARE

## 2021-11-17 VITALS
WEIGHT: 133 LBS | DIASTOLIC BLOOD PRESSURE: 69 MMHG | HEIGHT: 64 IN | HEART RATE: 66 BPM | BODY MASS INDEX: 22.71 KG/M2 | SYSTOLIC BLOOD PRESSURE: 130 MMHG | TEMPERATURE: 96.7 F

## 2021-11-17 DIAGNOSIS — K76.0 FATTY (CHANGE OF) LIVER, NOT ELSEWHERE CLASSIFIED: ICD-10-CM

## 2021-11-17 PROCEDURE — 99024 POSTOP FOLLOW-UP VISIT: CPT

## 2021-11-19 PROBLEM — K76.0 HEPATIC STEATOSIS: Status: ACTIVE | Noted: 2018-01-23

## 2021-11-19 NOTE — PHYSICAL EXAM
[Respiratory Effort] : normal respiratory effort [Alert] : alert [Calm] : calm [JVD] : no jugular venous distention  [Purpura] : no purpura  [de-identified] : Normal  [de-identified] : Normal  [de-identified] : Normal \par healing scars

## 2021-11-19 NOTE — HISTORY OF PRESENT ILLNESS
[de-identified] : Ms. PRISCILLA PINEDA is a 71 year  year old woman. She presents to the office on 11/19/2021 , her primary is doesn’t have PCP .\par \par She was admitted for enteritis . She was suspected for close loop and was taken to the OR, where she underwent a lap lysis and improved. Was discharged 3 days later. \par She is been doing well. She has some epigastric pain - its related to food. She also has bowel movements right after eating. \par These issues have been there from before surgery.

## 2021-11-19 NOTE — ASSESSMENT
[FreeTextEntry1] : Ms. PRISCILLA PINEDA is a 71 year  year old woman. She presents to the office on 11/19/2021 , her primary is doesn’t have PCP .\par \par She was admitted for enteritis . She was suspected for close loop and was taken to the OR, where she underwent a lap lysis and improved. Was discharged 3 days later. \par She is been doing well. She has some epigastric pain - its related to food. She also has bowel movements right after eating. \par These issues have been there from before surgery. \par \par impression:  closed loop obstruction \par pt recovered well\par \par We explained in great detail the pathophysiology of the disease process. We jesi diagrams and discussed the workup for diagnosis and management.\par The various options were explained to the patient. The Risk , benefit and alternatives were discussed. We discussed recovery and possible complications.\par The Post operative care was explained to the patient. She was counselled on diet , exercise and wound care.\par We discussed the pathology and surgery with her.\par \par She will see Dr. Alcantar for repeat EGD and gi symptoms. \par \par \par We discussed the importance of close follow up. \par We informed that she needs to follow up in 1 year  .\par We also informed that she can call us if anything changes or has any questions.\par

## 2022-01-07 ENCOUNTER — APPOINTMENT (OUTPATIENT)
Dept: ENDOCRINOLOGY | Facility: CLINIC | Age: 72
End: 2022-01-07

## 2022-02-22 ENCOUNTER — APPOINTMENT (OUTPATIENT)
Dept: ENDOCRINOLOGY | Facility: CLINIC | Age: 72
End: 2022-02-22
Payer: MEDICARE

## 2022-02-22 VITALS
HEART RATE: 57 BPM | OXYGEN SATURATION: 98 % | TEMPERATURE: 97.1 F | SYSTOLIC BLOOD PRESSURE: 123 MMHG | DIASTOLIC BLOOD PRESSURE: 77 MMHG | HEIGHT: 64 IN | WEIGHT: 135 LBS | BODY MASS INDEX: 23.05 KG/M2

## 2022-02-22 DIAGNOSIS — K55.049 ACUTE INFARCTION OF LARGE INTESTINE, EXTENT UNSPECIFIED: ICD-10-CM

## 2022-02-22 DIAGNOSIS — R93.89 ABNORMAL FINDINGS ON DIAGNOSTIC IMAGING OF OTHER SPECIFIED BODY STRUCTURES: ICD-10-CM

## 2022-02-22 PROCEDURE — 99214 OFFICE O/P EST MOD 30 MIN: CPT

## 2022-02-22 RX ORDER — MONTELUKAST 10 MG/1
10 TABLET, FILM COATED ORAL
Qty: 90 | Refills: 0 | Status: ACTIVE | COMMUNITY
Start: 2022-02-02

## 2022-02-22 RX ORDER — LOSARTAN POTASSIUM 50 MG/1
50 TABLET, FILM COATED ORAL DAILY
Qty: 90 | Refills: 3 | Status: DISCONTINUED | COMMUNITY
Start: 2021-10-18 | End: 2022-02-22

## 2022-02-27 NOTE — DATA REVIEWED
[FreeTextEntry1] : [facility]  (12/29/21)\par \par ,  A1c 6.3%\par CMP WNL\par , , \par Urine microalbumin ratio negative at 7.0 (normal < 30)\par Free T4 1.26 ng/dl  (0.82-1.77)\par TSH 30.1 uU/ml  (0.45-4.5)

## 2022-02-27 NOTE — REASON FOR VISIT
[DM Type 2] : DM Type 2 [Hypothyroidism] : hypothyroidism [Thyroid Cancer] : thyroid cancer [Follow - Up] : a follow-up visit

## 2022-02-27 NOTE — ASSESSMENT
[FreeTextEntry1] : 1) Type 2 DM:  Glycemic control is excellent as assessed by A1c level and fingerstick monitoring.  (Although the A1c level dates back to December, it is unlikely that her control has deteriorated since that time).\par --To continue metformin 500 mg BID\par --Continue focusing her fingerstick monitoring on post-prandial values \par \par 2) Hypothyroidism:  Free T4 level is normal but the TSH level is markedly elevated--clearly reflecting inadequate thyroxine replacement with her having misunderstood the instructions given at her last visit.\par --To increase to the previously prescribed dose of 1 tablet (100 mcg) 6 days/week, 1/2 tablet (50 mcg) on Sundays\par \par 3) Hyperlipidemia:  LDL-cholesterol level is well above the pt's target of 70 mg%, but is no doubt being adversely affected by her hypothyroidism.\par --To continue rosuvastatin 20 mg/day pending a repeat lipid profile after correction of her hypothyroidism\par \par 4) Hypertension:  BP is excellent at today's visit, and her readings at home now appear to be mostly in target range on her current regimen of increased doses of losartan and amlodipine.  \par --To continue the losartan at 100 mg/day but in split dose--50 mg BID (she will cut the 100 mg tabs in half)\par --To continue the amlodipine at 10 mg/day but in split dose--new Rx sent for 5 mg BID\par --Continue HCTZ\par \par 5) Anxiety/possible panic disorder: Superimposed on the pt's "real" medical issues are her anxiety about other possible illnesses and her general somatic preoccupation.  Need to see if she will be helped by an SSRI, but she has been taking the Lexapro only "prn." \par --Emphasized that she needs to take the drug on a daily basis  \par \par 6) Endometrial thickening on ultrasound:  The degree of thickening is clearly concerning.  Is likely to need diagnostic D & C/hysteroscopy\par --Pt referred to Dr. Estefanía Perez for evaluation\par \par Instructions for the pt's BP meds and levothyroxine written out for her to avoid misunderstanding.\par \par See for follow-up at the beginning of May.  CMP, lipids, A1c, TFTs, thyroglobulin, CBC, Fe/IBC, microalb before the visit [FreeTextEntry2] : Diet, post-prandial FS targets, BP targets

## 2022-02-27 NOTE — PHYSICAL EXAM
[Alert] : alert [Well Nourished] : well nourished [Healthy Appearance] : healthy appearance [EOMI] : extra ocular movement intact [No Proptosis] : no proptosis [No Lid Lag] : no lid lag [Normal Outer Ear/Nose] : the ears and nose were normal in appearance [Normal Hearing] : hearing was normal [No Neck Mass] : no neck mass was observed [No LAD] : no lymphadenopathy [Clear to Auscultation] : lungs were clear to auscultation bilaterally [Normal Rate] : heart rate was normal [Regular Rhythm] : with a regular rhythm [Carotids Normal] : carotid pulses were normal with no bruits [No Edema] : no peripheral edema [Soft] : abdomen soft [No HSM] : no hepato-splenomegaly [Normal Supraclavicular Nodes] : no supraclavicular lymphadenopathy [Normal Anterior Cervical Nodes] : no anterior cervical lymphadenopathy [No CVA Tenderness] : no ~M costovertebral angle tenderness [Normal Gait] : normal gait [No Involuntary Movements] : no involuntary movements were seen [No Joint Swelling] : no joint swelling seen [Normal Strength/Tone] : muscle strength and tone were normal [No Rash] : no rash [Right foot was examined, including] : right foot ~C was examined, including visual inspection with sensory and pulse exams [Left foot was examined, including] : left foot ~C was examined, including visual inspection with sensory and pulse exams [Normal] : normal [0] : 0 in the posterior tibialis [Vibration Dec.] : diminished vibratory sensation at the level of the toes [No Tremors] : no tremors [Normal Sensation on Monofilament Testing] : normal sensation on monofilament testing of lower extremities [Normal Affect] : the affect was normal [Normal Mood] : the mood was normal [1+] : 1+ in the dorsalis pedis [Kyphosis] : no kyphosis present [Acanthosis Nigricans] : no acanthosis nigricans [Foot Ulcers] : no foot ulcers [Hirsutism] : no hirsutism [Delayed in the Right Toes] : normal in the toes [Delayed in the Left Toes] : normal in the toes [Position Sense Dec.] : normal position sense at the level of the toes [Diminished Throughout Both Feet] : normal tactile sensation with monofilament testing throughout both feet [#1 Diminished] : number 1 was normal [#2 Diminished] : number 2 was normal [#3 Diminished] : number 3 was normal [#4 Diminished] : number 4 was normal [#5 Diminished] : number 5 was normal [#6 Diminished] : number 6 was normal [#8 Diminished] : number 8 was normal [#7 Diminished] : number 7 was normal [#9 Diminished] : number 9 was normal [#10 Diminished] : number 10 was normal [de-identified] : Pupils miotic.  Dense corneal arcus.  Moderate conjunctival injection [de-identified] : Thyroidectomy scar.  No palpable thyroid tissue [de-identified] : Short mid-systolic murmur at the LSB [de-identified] : DP pulses 1+ bilaterally [de-identified] : Area of point tenderness just below the liver edge [de-identified] : Vibratory sensation significantly decreased over the toes

## 2022-02-27 NOTE — REVIEW OF SYSTEMS
[SOB on Exertion] : shortness of breath on exertion [Anxiety] : anxiety [Fatigue] : fatigue [Dry Eyes] : dryness [Constipation] : constipation [Heartburn] : heartburn [Decreased Appetite] : appetite not decreased [Fever] : no fever [Chills] : no chills [Blurred Vision] : no blurred vision [Eyes Itch] : no itch [Dysphagia] : no dysphagia [Hearing Loss] : no hearing loss  [Chest Pain] : no chest pain [Palpitations] : no palpitations [Lower Ext Edema] : no lower extremity edema [Shortness Of Breath] : no shortness of breath [Cough] : no cough [Wheezing] : no wheezing [Nausea] : no nausea [Diarrhea] : no diarrhea [Polyuria] : no polyuria [Muscle Weakness] : no muscle weakness [Dysuria] : no dysuria [Myalgia] : no myalgia  [Dry Skin] : no dry skin [Hair Loss] : no hair loss [Ulcer] : no ulcer [Difficulty Walking] : no difficulty walking [Headaches] : no headaches [Tremors] : no tremors [Pain/Numbness of Digits] : no pain/numbness of digits [Depression] : no depression [Stress] : no stress [Polydipsia] : no polydipsia [Cold Intolerance] : no cold intolerance [Heat Intolerance] : no heat intolerance [Easy Bruising] : no tendency for easy bruising [Easy Bleeding] : no ~M tendency for easy bleeding [FreeTextEntry2] : Says that she has gained 5 lb since her last visit [FreeTextEntry4] : Chronic nasal congestion [FreeTextEntry8] : Nocturia 3X/night [FreeTextEntry9] : Arthralgias in her hands (R > L) when she first wakes up in the morning

## 2022-02-27 NOTE — HISTORY OF PRESENT ILLNESS
[FreeTextEntry1] : 71-year-old woman who is followed for type 2 DM, hyperlipidemia, hypertension, papillary thyroid CA and post-surgical hypothyroidism.  She is s/p partial left thyroid lobectomy in 1969, followed by a completion thyroidectomy in 2000.  The pathology after the latter surgery showed follicular variant of papillary CA, without any local extension or vascular invasion.  She received 75 mCi I-131 post-op to ablate a large left lobe remnant.  She has been free of any recurrent or metastatic disease since that time.  Her diabetes was diagnosed in December of 2016, having progressed after a 3-year history of pre-diabetes. \par \par Interim history since her last visit:\par --Was hospitalized at [facility] in early November for abdominal pain and SBO.  Pt underwent exploratory laparoscopy on 11/2, and the obstruction was found to be due to trapping of a small bowel loop in an omental defect.  The bowel loop appeared hemorrhagic and ischemic, but the ischemia reversed when the loop was freed up, and no resection was necessary.\par She saw the surgeon for follow-up two weeks after discharge, and he told her that no further intervention was necessary.\par --Had an EGD and colonoscopy last week, but does not know the results.  The colonoscopy was done because her last study dates back to 2015.  She is not sure why the EGD was done--(?? for abdominal pain)\par --Pelvic ultrasound done on 10/6/21 showed an abnormally thickened endometrial lining (6 mm).  This has not been further investigated.\par --Was told by her cardiologist on [place] that her BP was "too high" and he increased the losartan to 100 mg/day;  She herself has also increased the amlodipine to 10 mg/day because of systolics which are intermittently in the 140 range.\par \par Glucoses have been under control.\par --Pre-breakfast values are .\par --Breakfast is either oatmeal or yogurt.  Has half an apple with each of these.  Also has a hard-boiled egg at breakfast, and another one later in the day\par --Fingersticks after breakfast are 120-140\par --Lunch is just fish and vegetables.  Fingersticks after this meal are also 120-140\par --Supper is similar to lunch.\par Medications reviewed:\par --Misunderstood the instructions regarding the levothyroxine, and is taking 1/2 tablet 6 days/week and a whole tablet on Sunday--(rather than the reverse)\par --Is taking the escitalopram, but "not every day"

## 2022-03-07 NOTE — CHIEF COMPLAINT
[FreeTextEntry1] : 70 y/o referred from \par \par Obhx:\par GYNhx:\par \par Pmhx:\par Sx:\par \par Meds:\par Allergies:\par \par SocialHx:\par FamHx:\par \par Health Maintenance\par Last pap smear:\par Last Mammogram

## 2022-03-08 ENCOUNTER — APPOINTMENT (OUTPATIENT)
Dept: GYNECOLOGIC ONCOLOGY | Facility: CLINIC | Age: 72
End: 2022-03-08

## 2022-03-31 ENCOUNTER — NON-APPOINTMENT (OUTPATIENT)
Age: 72
End: 2022-03-31

## 2022-05-02 ENCOUNTER — APPOINTMENT (OUTPATIENT)
Dept: ENDOCRINOLOGY | Facility: CLINIC | Age: 72
End: 2022-05-02
Payer: MEDICARE

## 2022-05-02 VITALS
DIASTOLIC BLOOD PRESSURE: 64 MMHG | BODY MASS INDEX: 22.36 KG/M2 | SYSTOLIC BLOOD PRESSURE: 136 MMHG | HEIGHT: 64 IN | HEART RATE: 61 BPM | OXYGEN SATURATION: 98 % | WEIGHT: 131 LBS | TEMPERATURE: 97.7 F

## 2022-05-02 DIAGNOSIS — E61.1 IRON DEFICIENCY: ICD-10-CM

## 2022-05-02 PROCEDURE — 99215 OFFICE O/P EST HI 40 MIN: CPT

## 2022-05-03 RX ORDER — LOSARTAN POTASSIUM 100 MG/1
100 TABLET, FILM COATED ORAL
Qty: 90 | Refills: 3 | Status: DISCONTINUED | COMMUNITY
Start: 2022-02-11 | End: 2022-05-03

## 2022-05-03 NOTE — HISTORY OF PRESENT ILLNESS
[FreeTextEntry1] : 72-year-old woman who is followed for type 2 DM, hyperlipidemia, hypertension, papillary thyroid CA and post-surgical hypothyroidism.  She is s/p partial left thyroid lobectomy in 1969, but it is unclear if the surgery was done for a carcinoma--no pathology report was available.  She then underwent a completion thyroidectomy in 2000, with the pathology showing a follicular variant of papillary CA, without any local extension or vascular invasion.  She received 75 mCi I-131 post-op to ablate a large left lobe remnant.  She has been free of any recurrent or metastatic disease to date.  Her diabetes was diagnosed in December of 2016, having progressed after a 3-year history of pre-diabetes. \par \par Complains of weakness, dehydration and borderline low BPs at home,\par Is fixated on her BP, and has been testing it 5-6X/day.  Most of the readings are in the 110-130/65-75 range, with occasional diastolics in the low 80s.  Her log shows only one or two days when her systolics had dropped into the  range.  It is impossible to get a clear answer from her as to what medication regimen she is following, however, since she appears to be holding amlodipine and/or losartan doses for BPs which are not actually low.  . \par --When asked if she is taking half a losartan tablet twice a day she says yes, but she has gone back to taking the 50 mg tablets.  (The last prescription was for 100 mg tablets)\par --When we looked at her log for the past 3 days, during which all of her BPs were in target range, and I asked her if she was taking 1/2 tablet losartan and 1/2 tablet amlodipine twice a day, she admitted that she hadn't.  Her log noted "1 amlodipine" in the evening of 4/29, and no losartan.\par The borderline hypotension she complained about occurred during 2 days in March.\par She denies that she is anxious, and has been taking the Lexapro only intermittently.\par Diet reviewed:\par --Has been eating "Honey Bunches of Oats" for breakfast, sometimes with a banana, but says that her 2-hour post-prandial fingersticks are below 130 mg%\par --Lunch (nearly every day) is salmon, steamed broccoli, carrots and sometimes potato.  Fingersticks after this meal are in the 130-140 range\par --Supper is usually the same as lunch\par --Has sliced pineapple after either lunch or supper \par Fingerstick after a slice of cheesecake was up to 176, but she denies any other intake of concentrated sweets\par Had a recent EGD (which showed esophagitis without bleeding) and colonoscopy (which showed hemorrhoids and moderate diverticulosis, though biopsies were taken to rule out collagenous colitis)\par She continues to complain of chest "heaviness," but a copy of the report from her hospitalization in September mentions a coronary angiogram which showed normal coronaries.  (A stress-MIBI which had been done in May was also negative)\par \par PCP Dr. Billy\par Phone    [phone]\par Fax   [phone]

## 2022-05-03 NOTE — PHYSICAL EXAM
[Alert] : alert [Well Nourished] : well nourished [Healthy Appearance] : healthy appearance [EOMI] : extra ocular movement intact [No Proptosis] : no proptosis [No Lid Lag] : no lid lag [Normal Outer Ear/Nose] : the ears and nose were normal in appearance [Normal Hearing] : hearing was normal [No Neck Mass] : no neck mass was observed [No LAD] : no lymphadenopathy [Clear to Auscultation] : lungs were clear to auscultation bilaterally [Normal Rate] : heart rate was normal [Regular Rhythm] : with a regular rhythm [Carotids Normal] : carotid pulses were normal with no bruits [No Edema] : no peripheral edema [Soft] : abdomen soft [No HSM] : no hepato-splenomegaly [Normal Supraclavicular Nodes] : no supraclavicular lymphadenopathy [Normal Anterior Cervical Nodes] : no anterior cervical lymphadenopathy [No CVA Tenderness] : no ~M costovertebral angle tenderness [Normal Gait] : normal gait [No Involuntary Movements] : no involuntary movements were seen [No Joint Swelling] : no joint swelling seen [Normal Strength/Tone] : muscle strength and tone were normal [No Rash] : no rash [Normal] : normal [Vibration Dec.] : diminished vibratory sensation at the level of the toes [No Tremors] : no tremors [Normal Sensation on Monofilament Testing] : normal sensation on monofilament testing of lower extremities [Normal Affect] : the affect was normal [Normal Mood] : the mood was normal [Not Tender] : non-tender [Kyphosis] : no kyphosis present [Acanthosis Nigricans] : no acanthosis nigricans [Foot Ulcers] : no foot ulcers [Hirsutism] : no hirsutism [Delayed in the Right Toes] : normal in the toes [Delayed in the Left Toes] : normal in the toes [1+] : 1+ in the posterior tibialis [2+] : 2+ in the dorsalis pedis [Position Sense Dec.] : normal position sense at the level of the toes [Diminished Throughout Both Feet] : normal tactile sensation with monofilament testing throughout both feet [#1 Diminished] : number 1 was normal [#2 Diminished] : number 2 was normal [#3 Diminished] : number 3 was normal [#4 Diminished] : number 4 was normal [#5 Diminished] : number 5 was normal [#6 Diminished] : number 6 was normal [#7 Diminished] : number 7 was normal [#8 Diminished] : number 8 was normal [#9 Diminished] : number 9 was normal [#10 Diminished] : number 10 was normal [de-identified] : Despite her complaint of being "very pale," she does not appear so [de-identified] : Pupils miotic.  Dense corneal arcus. [de-identified] : Thyroidectomy scar.  No palpable thyroid tissue [de-identified] : Short mid-systolic murmur at the LSB [de-identified] : DP pulses 2+ bilaterally [de-identified] : Vibratory sensation significantly decreased over the toes

## 2022-05-03 NOTE — DATA REVIEWED
[FreeTextEntry1] : [facility]  (4/22/22)\par \par FBS 94,  A1c 6.6%\par CMP WNL\par LDL 46, , TG 62\par Urine microalbumin ratio negative at 17  (normal < 30)\par TSH 0.596 uU/ml  (0.45-4.5)\par Thyroglobulin level < 0.1 ng/ml  (antibodies neg)\par CBC WNL though Fe saturation was low at 11%
Oriented - self; Oriented - place; Oriented - time

## 2022-05-03 NOTE — ASSESSMENT
[FreeTextEntry1] : 1) Type 2 DM:  Glycemic control remains excellent as assessed by both A1c level and fingerstick monitoring, though her A1c levels have been slowly increasing, and I would actually have expected an even lower A1c level given her reported fingersticks.  Would wonder if she has undetected hyperglycemia after eating pineapple, etc at night, and I am very skeptical about fingersticks after a sweetened cereal plus a banana at breakfast being "120."\par --Continue monotherapy with metformin for now\par --Encouraged her to test fingersticks more frequently after breakfast, and also after those meals where she has fruit for dessert.  (I also obviously encouraged her to switch to a non-sweetened cereal for breakfast)\par \par 2) Hypothyroidism:  TSH level is in the target range of 0.4-1.0 uU/ml\par --Continue levothyroxine 100 mcg 6 days/week, 50 mcg (1/2 tablet) 1 day/week\par \par 3) Papillary thyroid CA: Thyroglobulin level remains undetectable.  Her last ultrasound in October of 2021 was negative.  She remains without evidence of recurrent disease since her last surgery and I-131 therapy in 2000.\par --Will decrease the frequency of ultrasounds to every 2 years\par \par 4) Hyperlipidemia:  LDL-cholesterol level is well below her target of 70 mg%.  (Would actually consider a target of 100 mg% at this point given the absence of any CAD on angiography).\par --Continue rosuvastatin 20 mg/day\par \par 5) Hypertension:  BP is excellent at today's visit, as it appears to be on the vast majority of her readings checked at home.  The problem is that it is impossible to determine what she is actually taking in terms of medication, and it is clearly quite inconsistent.  She is also overly preoccupied with the few readings which were in the  range systolic.\par For now, \par --Told her to check BPs only in the morning and late afternoon\par --To continue losartan 25 mg BID (1/2 of 50 mg tablet BID) and emphasized the need to take this even if her BP is normal\par --Will discontinue the amlodipine for now\par (Note that the prognosis for her following through with this plan is extremely guarded)\par \par 6) Panic disorder:  The diagnosis of panic disorder is based on her typical symptoms and frequent trips to the Emergency Room.  Superimposed on this, however, is her severe somatic preoccupation and fear of serious disease even after this is ruled out.  She has been checking BPs 6-7X/day even though the vast majority of her readings are in target range and she has no symptoms to suggest either spikes or troughs. She also complains of being "pale" and "dehydrated" when she is not.\par She has been taking the escitalopram only intermittently, but without any side-effects.\par --Strongly encouraged her to take the escitalopram consistently--start at 2.5 mg/day for 1 week, then increase to 5 mg/day.  Possible side-effects of nausea, diarrhea, etc discussed\par \par 7) Iron deficiency:  Pt has iron deficiency without an associated anemia.  Had a recent EGD and colonoscopy without any lesions or evidence of bleeding.\par --To start FeSO4 325 mg daily\par \par See for follow-up in 3 months.  CMP, lipids, A1c, TFTs, microalb, CBC, Fe/IBC before the visit [FreeTextEntry2] : Diet, increased post-prandial FS monitoring, BP targets and consistency in taking meds

## 2022-05-03 NOTE — REVIEW OF SYSTEMS
[Fatigue] : fatigue [Dry Eyes] : dryness [SOB on Exertion] : shortness of breath on exertion [Constipation] : constipation [Heartburn] : heartburn [Anxiety] : anxiety [Decreased Appetite] : appetite not decreased [Recent Weight Loss (___ Lbs)] : recent weight loss: [unfilled] lbs [Fever] : no fever [Chills] : no chills [Blurred Vision] : no blurred vision [Eyes Itch] : no itch [Dysphagia] : no dysphagia [Hearing Loss] : no hearing loss  [Chest Pain] : no chest pain [Palpitations] : no palpitations [Lower Ext Edema] : no lower extremity edema [Shortness Of Breath] : no shortness of breath [Cough] : no cough [Wheezing] : no wheezing [Nausea] : no nausea [Diarrhea] : no diarrhea [Polyuria] : no polyuria [Dysuria] : no dysuria [Muscle Weakness] : no muscle weakness [Myalgia] : no myalgia  [Dry Skin] : no dry skin [Hair Loss] : no hair loss [Ulcer] : no ulcer [Headaches] : no headaches [Difficulty Walking] : no difficulty walking [Tremors] : no tremors [Pain/Numbness of Digits] : no pain/numbness of digits [Depression] : no depression [Stress] : no stress [Polydipsia] : no polydipsia [Cold Intolerance] : no cold intolerance [Heat Intolerance] : no heat intolerance [Easy Bleeding] : no ~M tendency for easy bleeding [Easy Bruising] : no tendency for easy bruising [FreeTextEntry3] : Says "I can't see when I first wake up in the morning" [FreeTextEntry7] : No rectal bleeding [FreeTextEntry8] : Nocturia 4-5X/night [FreeTextEntry9] : Arthralgias in her hands (R > L) when she first wakes up in the morning [de-identified] : Complains "I'm very pale"

## 2022-07-07 ENCOUNTER — APPOINTMENT (OUTPATIENT)
Dept: ENDOCRINOLOGY | Facility: CLINIC | Age: 72
End: 2022-07-07

## 2022-07-07 VITALS
BODY MASS INDEX: 20.14 KG/M2 | TEMPERATURE: 97.2 F | DIASTOLIC BLOOD PRESSURE: 74 MMHG | RESPIRATION RATE: 16 BRPM | SYSTOLIC BLOOD PRESSURE: 140 MMHG | HEART RATE: 52 BPM | HEIGHT: 64 IN | OXYGEN SATURATION: 100 % | WEIGHT: 118 LBS

## 2022-07-07 PROCEDURE — 99214 OFFICE O/P EST MOD 30 MIN: CPT

## 2022-07-07 RX ORDER — MECLIZINE HYDROCHLORIDE 12.5 MG/1
12.5 TABLET ORAL
Qty: 180 | Refills: 0 | Status: ACTIVE | COMMUNITY
Start: 2022-06-30

## 2022-07-10 NOTE — HISTORY OF PRESENT ILLNESS
[FreeTextEntry1] : 72-year-old woman who is followed for type 2 DM, hyperlipidemia, hypertension, papillary thyroid CA and post-surgical hypothyroidism.  She is s/p partial left thyroid lobectomy in 1969, but it is unclear if the surgery was done for a carcinoma--no pathology report was available.  She then underwent a completion thyroidectomy in 2000, with the pathology showing a follicular variant of papillary CA, without any local extension or vascular invasion.  She received 75 mCi I-131 post-op to ablate a large left lobe remnant.  She has been free of any recurrent or metastatic disease to date.  Her diabetes was diagnosed in December of 2016, having progressed after a 3-year history of pre-diabetes. 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

## 2022-07-10 NOTE — REVIEW OF SYSTEMS
[Dry Eyes] : dryness [SOB on Exertion] : shortness of breath on exertion [Constipation] : constipation [Anxiety] : anxiety [Heat Intolerance] : heat intolerance [Fatigue] : no fatigue [Decreased Appetite] : appetite not decreased [Fever] : no fever [Chills] : no chills [Blurred Vision] : no blurred vision [Eyes Itch] : no itch [Dysphagia] : no dysphagia [Hearing Loss] : no hearing loss  [Chest Pain] : no chest pain [Lower Ext Edema] : no lower extremity edema [Palpitations] : no palpitations [Shortness Of Breath] : no shortness of breath [Cough] : no cough [Wheezing] : no wheezing [Nausea] : no nausea [Heartburn] : no heartburn [Diarrhea] : no diarrhea [Polyuria] : no polyuria [Dysuria] : no dysuria [Joint Pain] : no joint pain [Muscle Weakness] : no muscle weakness [Myalgia] : no myalgia  [Joint Stiffness] : no joint stiffness [Dry Skin] : no dry skin [Hair Loss] : no hair loss [Ulcer] : no ulcer [Headaches] : no headaches [Difficulty Walking] : no difficulty walking [Tremors] : no tremors [Pain/Numbness of Digits] : no pain/numbness of digits [Depression] : no depression [Stress] : no stress [Polydipsia] : no polydipsia [Cold Intolerance] : no cold intolerance [Easy Bleeding] : no ~M tendency for easy bleeding [Easy Bruising] : no tendency for easy bruising [FreeTextEntry2] : Has lost 13 lb since her last visit [FreeTextEntry4] : Dry throat [FreeTextEntry7] : Heartburn is under control with famotidine.  Constipation is improved [FreeTextEntry9] : May be having intermittent triggering of the R 4th finger [FreeTextEntry8] : Nocturia 4-5X/night [de-identified] : Has occasional left-sided headaches.  See comments in the HPI re: vertigo [de-identified] : Says that her anxiety has decreased on the escitalopram

## 2022-07-10 NOTE — ASSESSMENT
[FreeTextEntry1] :  1) Type 2 DM:  Glycemic control is excellent as assessed by A1c level and fingerstick monitoring.  She has also lost a significant amount of weight on her high-fiber diet.\par --Continue metformin- mg BID\par --Focus her fingerstick monitoring on post-prandial values--glo breakfast, which may have an excessive amount of fruit\par \par 2) Hypothyroidism: TSH level is in the suppressed range, which is no longer her target despite her history of thyroid CA.  (TSH suppression is no longer warranted given her age and long disease-free interval).\par --To decrease the levothyroxine to 100 mcg 6 days/week. (No pill on Sunday)\par \par 3) Hyperlipidemia: LDL-cholesterol level is below her target of 70 mg%\par --Continue rosuvastatin 20 mg/day\par \par 4) Hypertension:  BP is satisfactory at today's visit, and her readings at home have been < 130/80.  The improvement is almost certainly due to her taking her medications consistently.\par --Continue combination therapy with HCTZ and losartan\par \par 5) Anxiety/panic disorder:  Although the pt cannot be specific about the way that she feels "better" on the escitalopram, the change is obvious at today's visit--she is calmer, able to focus on the issue being discussed, and most of all she is no longer as somatically preoccupied.  (Would have liked to see if her daughter had also noted a difference, but she says that she has had little recent contact with her daughter, who works nights).\par --To increase the escitalopram to 7.5 mg/day \par \par 6) Thyroid CA:  Her last ultrasound was in October of 2021, but will defer a repeat until later this year or early next year.  Her thyroglobulin level was undetectable in April of this year\par \par See for follow-up in 4 months.  CMP, lipids, A1c, microalb, TFTs before the visit [FreeTextEntry2] : Diet, BP targets

## 2022-07-10 NOTE — DATA REVIEWED
[FreeTextEntry1] : [facility]  (7/1/22)\par \par FBS 87, A1c 6.3%\par CMP and CBC WNL\par LDL 67, , TG 51\par Urine microalbumin ratio negative at 14  (normal < 30)\par TSH 0.26 uU/ml  (0.45-4.5)\par

## 2022-07-10 NOTE — PHYSICAL EXAM
[Alert] : alert [Healthy Appearance] : healthy appearance [EOMI] : extra ocular movement intact [No Proptosis] : no proptosis [No Lid Lag] : no lid lag [Normal Outer Ear/Nose] : the ears and nose were normal in appearance [Normal Hearing] : hearing was normal [No Neck Mass] : no neck mass was observed [No LAD] : no lymphadenopathy [Clear to Auscultation] : lungs were clear to auscultation bilaterally [Normal Rate] : heart rate was normal [Regular Rhythm] : with a regular rhythm [Carotids Normal] : carotid pulses were normal with no bruits [No Edema] : no peripheral edema [Soft] : abdomen soft [No HSM] : no hepato-splenomegaly [Normal Supraclavicular Nodes] : no supraclavicular lymphadenopathy [Normal Anterior Cervical Nodes] : no anterior cervical lymphadenopathy [No CVA Tenderness] : no ~M costovertebral angle tenderness [Normal Gait] : normal gait [No Involuntary Movements] : no involuntary movements were seen [No Joint Swelling] : no joint swelling seen [Normal Strength/Tone] : muscle strength and tone were normal [No Rash] : no rash [Normal] : normal [2+] : 2+ in the dorsalis pedis [Vibration Dec.] : diminished vibratory sensation at the level of the toes [No Tremors] : no tremors [Normal Sensation on Monofilament Testing] : normal sensation on monofilament testing of lower extremities [Normal Affect] : the affect was normal [Normal Mood] : the mood was normal [No Murmurs] : no murmurs [0] : 0 in the posterior tibialis [Kyphosis] : no kyphosis present [Acanthosis Nigricans] : no acanthosis nigricans [Foot Ulcers] : no foot ulcers [Hirsutism] : no hirsutism [Delayed in the Right Toes] : normal in the toes [Delayed in the Left Toes] : normal in the toes [Position Sense Dec.] : normal position sense at the level of the toes [Diminished Throughout Both Feet] : normal tactile sensation with monofilament testing throughout both feet [#1 Diminished] : number 1 was normal [#2 Diminished] : number 2 was normal [#3 Diminished] : number 3 was normal [#4 Diminished] : number 4 was normal [#5 Diminished] : number 5 was normal [#7 Diminished] : number 7 was normal [#6 Diminished] : number 6 was normal [#8 Diminished] : number 8 was normal [#9 Diminished] : number 9 was normal [#10 Diminished] : number 10 was normal [de-identified] : Noticeably thinner [de-identified] : Pupils miotic.  Dense corneal arcus. Mild conjunctival injection OS [de-identified] : Thyroidectomy scar.  No palpable thyroid tissue [de-identified] : DP pulses 2+ bilaterally [de-identified] : Mild epigastric guarding to deep palpation [de-identified] : Mild thickening of the flexor tendon of the R 4th finger.  No definite triggering elicited [de-identified] : Vibratory sensation significantly decreased over the toes [de-identified] : Pt is noticeably calmer

## 2022-11-07 ENCOUNTER — APPOINTMENT (OUTPATIENT)
Dept: ENDOCRINOLOGY | Facility: CLINIC | Age: 72
End: 2022-11-07

## 2022-11-07 VITALS
WEIGHT: 122 LBS | HEART RATE: 77 BPM | HEIGHT: 64 IN | BODY MASS INDEX: 20.83 KG/M2 | DIASTOLIC BLOOD PRESSURE: 51 MMHG | OXYGEN SATURATION: 98 % | TEMPERATURE: 96.4 F | SYSTOLIC BLOOD PRESSURE: 107 MMHG

## 2022-11-07 DIAGNOSIS — C73 MALIGNANT NEOPLASM OF THYROID GLAND: ICD-10-CM

## 2022-11-07 DIAGNOSIS — I10 ESSENTIAL (PRIMARY) HYPERTENSION: ICD-10-CM

## 2022-11-07 DIAGNOSIS — K21.9 GASTRO-ESOPHAGEAL REFLUX DISEASE W/OUT ESOPHAGITIS: ICD-10-CM

## 2022-11-07 DIAGNOSIS — E78.2 MIXED HYPERLIPIDEMIA: ICD-10-CM

## 2022-11-07 DIAGNOSIS — F41.0 PANIC DISORDER [EPISODIC PAROXYSMAL ANXIETY]: ICD-10-CM

## 2022-11-07 DIAGNOSIS — E89.0 POSTPROCEDURAL HYPOTHYROIDISM: ICD-10-CM

## 2022-11-07 DIAGNOSIS — E11.9 TYPE 2 DIABETES MELLITUS W/OUT COMPLICATIONS: ICD-10-CM

## 2022-11-07 PROCEDURE — 99214 OFFICE O/P EST MOD 30 MIN: CPT

## 2022-11-07 RX ORDER — ESCITALOPRAM OXALATE 5 MG/1
5 TABLET ORAL
Refills: 0 | Status: DISCONTINUED | COMMUNITY
Start: 2022-07-10 | End: 2022-11-07

## 2022-11-07 RX ORDER — AMLODIPINE BESYLATE 5 MG/1
5 TABLET ORAL
Refills: 0 | Status: DISCONTINUED | COMMUNITY
Start: 2020-12-18 | End: 2022-11-07

## 2022-11-07 RX ORDER — FAMOTIDINE 40 MG/1
40 TABLET, FILM COATED ORAL
Qty: 90 | Refills: 0 | Status: DISCONTINUED | COMMUNITY
Start: 2022-06-20 | End: 2022-11-07

## 2022-11-07 RX ORDER — CYCLOSPORINE 0.5 MG/ML
0.05 EMULSION OPHTHALMIC
Qty: 60 | Refills: 0 | Status: ACTIVE | COMMUNITY
Start: 2022-10-12

## 2022-11-07 RX ORDER — OMEPRAZOLE 20 MG/1
20 CAPSULE, DELAYED RELEASE ORAL DAILY
Qty: 90 | Refills: 3 | Status: ACTIVE | COMMUNITY
Start: 2022-11-07

## 2022-11-07 NOTE — ASSESSMENT
[FreeTextEntry1] : 1) Type 2 DM:  Glycemic control is excellent as assessed by both A1c level and fingerstick monitoring.\par --To continue metformin- mg BID\par --Continue post-prandial fingerstick monitoring\par \par 2) Hyperlipidemia:  LDL-cholesterol level is below her target of 70 mg%.\par --Continue rosuvastatin 20 mg/day\par \par 3) Hypothyroidism:  TSH level is in the target range of 0.4-1.0 uU/ml--(non-suppressed despite her history of thyroid CA since she has been free of any recurrent disease for over 20 years)\par --Continue levothyroxine 100 mcg 6 days/week, 50 mcg (1/2 tablet) 1 day/week\par \par 4) Hypertension:  BP control appears to be excessively tight, including today's reading.  She has already stopped the amlodipine\par --To continue the losartan 25 mg BID, but stop the HCTZ for now\par \par 5) Papillary thyroid CA:  To have the thyroid ultrasound done prior to her next visit\par \par 6) Anxiety/panic disorder:  Anxiety is markedly better on the escitalopram, and she has not had any panic attacks. She is still somewhat anxious, however, and still somatically preoccupied\par --To try increasing the escitalopram to 7.5 mg/day\par \par See for follow-up in 4 months.  CMP, lipids, A1c, TFTs, microalbumin before the visit [FreeTextEntry2] : Diet, BP targets

## 2022-11-07 NOTE — HISTORY OF PRESENT ILLNESS
[FreeTextEntry1] : 72-year-old woman who is followed for type 2 DM, hyperlipidemia, hypertension, papillary thyroid CA and post-surgical hypothyroidism.  She is s/p partial left thyroid lobectomy in 1969, but it is unclear if the surgery was done for a carcinoma--no pathology report was available.  She then underwent a completion thyroidectomy in 2000, with the pathology showing a follicular variant of papillary CA, without any local extension or vascular invasion.  She received 75 mCi I-131 post-op to ablate a large left lobe remnant.  She has been free of any recurrent or metastatic disease to date.  Her diabetes was diagnosed in December of 2016, having progressed after a 3-year history of pre-diabetes. Her glycemic control is excellent on monotherapy with metformin. \par \michael Has not had any acute illnesses since her last visit, but complains that her saliva is "white, like yogurt."  Saw an ENT, was told that "everything is OK."\michael Was supposed to have a thyroid ultrasound, but her PCP instead ordered carotid Dopplers--(which were negative for significant stenosis)\par AM fingersticks are still near or slightly below 100 mg%, and essentially all of her 2-hr post-prandial readings are < 130 mg%.\par Diet is about the same:\par --Breakfast is still oatmeal with a whole banana and apple sauce.  (Despite the excessive fruit, she insists that her fingersticks after the meal are below 130 mg%)\par --The other two meals are nearly always salmon and cooked vegetables.  Has chicken once a week.  If she has starch at the meal, it will be potato. \par BPs at home are < 130 systolic, though it seems as if many are in the  range.  Diastolics are < 65.\par Ophth exam last month was negative.\michael Denies any numbness or paresthesias in her toes.\michael Is taking the escitalopram consistently, but did not increase to 7.5 mg/day as discussed\michael Had flu shot and Bivalent COVID booster last month

## 2022-11-07 NOTE — PHYSICAL EXAM
[Alert] : alert [Healthy Appearance] : healthy appearance [EOMI] : extra ocular movement intact [No Proptosis] : no proptosis [No Lid Lag] : no lid lag [Normal Outer Ear/Nose] : the ears and nose were normal in appearance [Normal Hearing] : hearing was normal [No Neck Mass] : no neck mass was observed [No LAD] : no lymphadenopathy [Clear to Auscultation] : lungs were clear to auscultation bilaterally [No Murmurs] : no murmurs [Normal Rate] : heart rate was normal [Regular Rhythm] : with a regular rhythm [Carotids Normal] : carotid pulses were normal with no bruits [No Edema] : no peripheral edema [Soft] : abdomen soft [No HSM] : no hepato-splenomegaly [Normal Supraclavicular Nodes] : no supraclavicular lymphadenopathy [Normal Anterior Cervical Nodes] : no anterior cervical lymphadenopathy [No CVA Tenderness] : no ~M costovertebral angle tenderness [Normal Gait] : normal gait [No Involuntary Movements] : no involuntary movements were seen [No Joint Swelling] : no joint swelling seen [Normal Strength/Tone] : muscle strength and tone were normal [No Rash] : no rash [Normal] : normal [0] : 0 in the posterior tibialis [Vibration Dec.] : diminished vibratory sensation at the level of the toes [No Tremors] : no tremors [Normal Sensation on Monofilament Testing] : normal sensation on monofilament testing of lower extremities [Normal Affect] : the affect was normal [Normal Mood] : the mood was normal [Well Nourished] : well nourished [Normal Sclera/Conjunctiva] : normal sclera/conjunctiva [Normal Oropharynx] : the oropharynx was normal [Not Tender] : non-tender [1+] : 1+ in the dorsalis pedis [Kyphosis] : no kyphosis present [Acanthosis Nigricans] : no acanthosis nigricans [Foot Ulcers] : no foot ulcers [Hirsutism] : no hirsutism [Delayed in the Right Toes] : normal in the toes [Delayed in the Left Toes] : normal in the toes [Position Sense Dec.] : normal position sense at the level of the toes [Diminished Throughout Both Feet] : normal tactile sensation with monofilament testing throughout both feet [#1 Diminished] : number 1 was normal [#2 Diminished] : number 2 was normal [#3 Diminished] : number 3 was normal [#4 Diminished] : number 4 was normal [#5 Diminished] : number 5 was normal [#6 Diminished] : number 6 was normal [#7 Diminished] : number 7 was normal [#8 Diminished] : number 8 was normal [#9 Diminished] : number 9 was normal [#10 Diminished] : number 10 was normal [de-identified] : Pupils miotic.  Dense corneal arcus. [de-identified] : No signs of pharyngeal exudate [de-identified] : Thyroidectomy scar.  No palpable thyroid tissue [de-identified] : DP pulses 1+ bilaterally [de-identified] : Mild thickening of the flexor tendon of the R 4th finger.  No definite triggering elicited [de-identified] : Vibratory sensation nearly absent over the toes

## 2022-11-07 NOTE — DATA REVIEWED
[FreeTextEntry1] : [facility]  (10/31/22)\par \par FBS 90,  A1c 6.1%\par CMP WNL\par LDL 67, , TG 68\par Urine microalbumin ratio negative at 10  (normal < 30)\par TSH 0.54 uU/ml  (0.45-4.5)

## 2022-11-07 NOTE — REVIEW OF SYSTEMS
[Dry Eyes] : dryness [Anxiety] : anxiety [Heat Intolerance] : heat intolerance [Fatigue] : no fatigue [Decreased Appetite] : appetite not decreased [Fever] : no fever [Chills] : no chills [Blurred Vision] : no blurred vision [Eyes Itch] : no itch [Dysphagia] : no dysphagia [Hearing Loss] : no hearing loss  [Chest Pain] : no chest pain [Palpitations] : no palpitations [Lower Ext Edema] : no lower extremity edema [Shortness Of Breath] : no shortness of breath [Wheezing] : no wheezing [SOB on Exertion] : no shortness of breath on exertion [Nausea] : no nausea [Constipation] : no constipation [Heartburn] : no heartburn [Diarrhea] : no diarrhea [Polyuria] : no polyuria [Dysuria] : no dysuria [Joint Pain] : no joint pain [Muscle Weakness] : no muscle weakness [Myalgia] : no myalgia  [Joint Stiffness] : no joint stiffness [Dry Skin] : no dry skin [Hair Loss] : no hair loss [Ulcer] : no ulcer [Headaches] : no headaches [Difficulty Walking] : no difficulty walking [Tremors] : no tremors [Pain/Numbness of Digits] : no pain/numbness of digits [Depression] : no depression [Stress] : no stress [Polydipsia] : no polydipsia [Cold Intolerance] : no cold intolerance [Easy Bleeding] : no ~M tendency for easy bleeding [Easy Bruising] : no tendency for easy bruising [FreeTextEntry2] : Has gained back 4 lb since her last visit [FreeTextEntry4] : Dry throat.  See comments in the HPI re: complaints about her saliva [FreeTextEntry6] : Has an intermittent cough, mildly productive, often in the middle of the night [FreeTextEntry7] : Constipation is under control with Benefiber.  Was taking omeprazole for reflux until recently [FreeTextEntry8] : Nocturia 4-5X/night [FreeTextEntry9] : Still having intermittent triggering of the R 4th finger [de-identified] : Has not had any attacks of vertigo [de-identified] : Says that her anxiety has decreased on the escitalopram

## 2022-12-11 RX ORDER — METFORMIN ER 500 MG 500 MG/1
500 TABLET ORAL
Qty: 180 | Refills: 3 | Status: ACTIVE | COMMUNITY
Start: 2018-01-23 | End: 1900-01-01

## 2022-12-11 RX ORDER — LEVOTHYROXINE SODIUM 0.1 MG/1
100 TABLET ORAL
Qty: 90 | Refills: 3 | Status: ACTIVE | COMMUNITY
Start: 2020-05-14 | End: 1900-01-01

## 2022-12-11 RX ORDER — HYDROCHLOROTHIAZIDE 12.5 MG/1
12.5 CAPSULE ORAL
Qty: 90 | Refills: 3 | Status: ACTIVE | COMMUNITY
Start: 2021-10-19 | End: 1900-01-01

## 2022-12-11 RX ORDER — ROSUVASTATIN CALCIUM 20 MG/1
20 TABLET, FILM COATED ORAL
Qty: 90 | Refills: 3 | Status: ACTIVE | COMMUNITY
Start: 2021-04-05 | End: 1900-01-01

## 2022-12-11 RX ORDER — LOSARTAN POTASSIUM 50 MG/1
50 TABLET, FILM COATED ORAL
Qty: 90 | Refills: 3 | Status: ACTIVE | COMMUNITY
Start: 2022-05-03 | End: 1900-01-01

## 2023-02-03 ENCOUNTER — RX RENEWAL (OUTPATIENT)
Age: 73
End: 2023-02-03

## 2023-03-13 ENCOUNTER — APPOINTMENT (OUTPATIENT)
Dept: ENDOCRINOLOGY | Facility: CLINIC | Age: 73
End: 2023-03-13

## 2023-05-03 NOTE — ED ADULT NURSE NOTE - NSIMPLEMENTINTERV_GEN_ALL_ED
2+ B/L Implemented All Fall with Harm Risk Interventions:  [place] to call system. Call bell, personal items and telephone within reach. Instruct patient to call for assistance. Room bathroom lighting operational. Non-slip footwear when patient is off stretcher. Physically safe environment: no spills, clutter or unnecessary equipment. Stretcher in lowest position, wheels locked, appropriate side rails in place. Provide visual cue, wrist band, yellow gown, etc. Monitor gait and stability. Monitor for mental status changes and reorient to person, place, and time. Review medications for side effects contributing to fall risk. Reinforce activity limits and safety measures with patient and family. Provide visual clues: red socks.

## 2023-09-01 ENCOUNTER — RX RENEWAL (OUTPATIENT)
Age: 73
End: 2023-09-01

## 2023-09-01 RX ORDER — ESCITALOPRAM OXALATE 5 MG/1
5 TABLET ORAL
Qty: 180 | Refills: 0 | Status: ACTIVE | COMMUNITY
Start: 2021-10-18 | End: 1900-01-01

## 2024-04-10 NOTE — H&P ADULT - FAMILY HISTORY
Father  Still living? Unknown  Family history of coronary artery disease, Age at diagnosis: Age Unknown     Mother  Still living? No  Family history of coronary artery disease, Age at diagnosis: Age Unknown Detail Level: Detailed

## 2024-12-21 NOTE — ED ADULT NURSE REASSESSMENT NOTE - NS ED NURSE REASSESS COMMENT FT1
patient placed on Isolation, airborne, contact and droplet as per hospital policy due to r/o COVID.  Patient swabbed by provider, results pending.  Will continue to monitor. General

## 2025-06-04 NOTE — ED PROVIDER NOTE - DISPOSITION TYPE
Schedule endoscopy    Nothing to eat or drink after midnight., the night before the procedure  You will not be able to drive for 24 hours after the procedure due to sedation. Must have a responsible adult, 18 year or older, to accompany you and drive you home.  No aspirin, ibuprofen, naproxen, fish oil or vitamin E for 5 days before procedure.   Continue current medications.    If you are on blood thinners, clearance from the prescribing physician will be obtained before your procedure is scheduled.     If biopsies are taken during the procedure they will be sent to a pathologist for analysis. You will be notified by mail of the pathology results in 2-3 weeks.     Your physician may also schedule a follow up appointment with the nurse practitioner to discuss pathology, symptoms or to check if you have had any problems related to your procedure. If you prefer not to return to the office after your procedure, please discuss this with your physician on the day of your procedure.     ADMIT

## 2025-07-22 NOTE — H&P ADULT - NSHPPOAPULMEMBOLUS_GEN_A_CORE
PCP: Jay [facility] MD    LAST APPT:6/6/2025    Future Appointments   Date Time Provider Department Center   8/29/2025  8:00 AM Jay [facility] MD [facility] DEP       Requested Prescriptions     Pending Prescriptions Disp Refills    methylphenidate (RITALIN) 5 MG tablet 33 tablet 0     Sig: Take 1 tablet by mouth daily for 33 days. Max Daily Amount: 5 mg        no no